# Patient Record
Sex: MALE | Race: WHITE | Employment: OTHER | URBAN - METROPOLITAN AREA
[De-identification: names, ages, dates, MRNs, and addresses within clinical notes are randomized per-mention and may not be internally consistent; named-entity substitution may affect disease eponyms.]

---

## 2019-04-02 ENCOUNTER — HOSPITAL ENCOUNTER (OUTPATIENT)
Age: 69
Setting detail: OBSERVATION
Discharge: HOME | End: 2019-04-04
Attending: EMERGENCY MEDICINE | Admitting: INTERNAL MEDICINE
Payer: COMMERCIAL

## 2019-04-02 ENCOUNTER — APPOINTMENT (OUTPATIENT)
Dept: GENERAL RADIOLOGY | Age: 69
End: 2019-04-02
Attending: EMERGENCY MEDICINE
Payer: COMMERCIAL

## 2019-04-02 DIAGNOSIS — R07.9 CHEST PAIN, UNSPECIFIED TYPE: Primary | ICD-10-CM

## 2019-04-02 DIAGNOSIS — I25.119 CORONARY ARTERY DISEASE INVOLVING NATIVE HEART WITH ANGINA PECTORIS, UNSPECIFIED VESSEL OR LESION TYPE (HCC): ICD-10-CM

## 2019-04-02 DIAGNOSIS — R07.9 CHEST PAIN, UNSPECIFIED: ICD-10-CM

## 2019-04-02 PROBLEM — I20.0 UNSTABLE ANGINA (HCC): Status: ACTIVE | Noted: 2019-04-02

## 2019-04-02 LAB
BASOPHILS # BLD: 0 K/UL (ref 0–0.1)
BASOPHILS NFR BLD: 1 % (ref 0–1)
CHOLEST SERPL-MCNC: 131 MG/DL
COMMENT, HOLDF: NORMAL
DIFFERENTIAL METHOD BLD: ABNORMAL
EOSINOPHIL # BLD: 0.3 K/UL (ref 0–0.4)
EOSINOPHIL NFR BLD: 4 % (ref 0–7)
ERYTHROCYTE [DISTWIDTH] IN BLOOD BY AUTOMATED COUNT: 11.6 % (ref 11.5–14.5)
HCT VFR BLD AUTO: 43.6 % (ref 36.6–50.3)
HDLC SERPL-MCNC: 44 MG/DL
HDLC SERPL: 3 {RATIO} (ref 0–5)
HGB BLD-MCNC: 14.4 G/DL (ref 12.1–17)
IMM GRANULOCYTES # BLD AUTO: 0 K/UL (ref 0–0.04)
IMM GRANULOCYTES NFR BLD AUTO: 0 % (ref 0–0.5)
LDLC SERPL CALC-MCNC: 69 MG/DL (ref 0–100)
LIPID PROFILE,FLP: NORMAL
LYMPHOCYTES # BLD: 1.8 K/UL (ref 0.8–3.5)
LYMPHOCYTES NFR BLD: 27 % (ref 12–49)
MCH RBC QN AUTO: 35.2 PG (ref 26–34)
MCHC RBC AUTO-ENTMCNC: 33 G/DL (ref 30–36.5)
MCV RBC AUTO: 106.6 FL (ref 80–99)
MONOCYTES # BLD: 1 K/UL (ref 0–1)
MONOCYTES NFR BLD: 15 % (ref 5–13)
NEUTS SEG # BLD: 3.6 K/UL (ref 1.8–8)
NEUTS SEG NFR BLD: 53 % (ref 32–75)
NRBC # BLD: 0.02 K/UL (ref 0–0.01)
NRBC BLD-RTO: 0.3 PER 100 WBC
PLATELET # BLD AUTO: 189 K/UL (ref 150–400)
PMV BLD AUTO: 11.1 FL (ref 8.9–12.9)
RBC # BLD AUTO: 4.09 M/UL (ref 4.1–5.7)
SAMPLES BEING HELD,HOLD: NORMAL
TRIGL SERPL-MCNC: 90 MG/DL (ref ?–150)
TROPONIN I SERPL-MCNC: <0.05 NG/ML
TROPONIN I SERPL-MCNC: <0.05 NG/ML
VLDLC SERPL CALC-MCNC: 18 MG/DL
WBC # BLD AUTO: 6.7 K/UL (ref 4.1–11.1)

## 2019-04-02 PROCEDURE — 93005 ELECTROCARDIOGRAM TRACING: CPT

## 2019-04-02 PROCEDURE — 80053 COMPREHEN METABOLIC PANEL: CPT

## 2019-04-02 PROCEDURE — 74011250636 HC RX REV CODE- 250/636: Performed by: INTERNAL MEDICINE

## 2019-04-02 PROCEDURE — 99284 EMERGENCY DEPT VISIT MOD MDM: CPT

## 2019-04-02 PROCEDURE — 36415 COLL VENOUS BLD VENIPUNCTURE: CPT

## 2019-04-02 PROCEDURE — 80061 LIPID PANEL: CPT

## 2019-04-02 PROCEDURE — 71046 X-RAY EXAM CHEST 2 VIEWS: CPT

## 2019-04-02 PROCEDURE — 85025 COMPLETE CBC W/AUTO DIFF WBC: CPT

## 2019-04-02 PROCEDURE — 99218 HC RM OBSERVATION: CPT

## 2019-04-02 PROCEDURE — 74011250637 HC RX REV CODE- 250/637: Performed by: INTERNAL MEDICINE

## 2019-04-02 PROCEDURE — 74011250637 HC RX REV CODE- 250/637: Performed by: EMERGENCY MEDICINE

## 2019-04-02 PROCEDURE — 84484 ASSAY OF TROPONIN QUANT: CPT

## 2019-04-02 PROCEDURE — 96372 THER/PROPH/DIAG INJ SC/IM: CPT

## 2019-04-02 RX ORDER — ENOXAPARIN SODIUM 150 MG/ML
1 INJECTION SUBCUTANEOUS EVERY 12 HOURS
Status: DISCONTINUED | OUTPATIENT
Start: 2019-04-03 | End: 2019-04-03

## 2019-04-02 RX ORDER — ENOXAPARIN SODIUM 150 MG/ML
120 INJECTION SUBCUTANEOUS ONCE
Status: COMPLETED | OUTPATIENT
Start: 2019-04-02 | End: 2019-04-02

## 2019-04-02 RX ORDER — SODIUM CHLORIDE 0.9 % (FLUSH) 0.9 %
5-40 SYRINGE (ML) INJECTION EVERY 8 HOURS
Status: DISCONTINUED | OUTPATIENT
Start: 2019-04-02 | End: 2019-04-04 | Stop reason: HOSPADM

## 2019-04-02 RX ORDER — METOPROLOL SUCCINATE 25 MG/1
50 TABLET, EXTENDED RELEASE ORAL
COMMUNITY

## 2019-04-02 RX ORDER — NITROGLYCERIN 0.4 MG/1
0.4 TABLET SUBLINGUAL AS NEEDED
Status: COMPLETED | OUTPATIENT
Start: 2019-04-02 | End: 2019-04-03

## 2019-04-02 RX ORDER — LORAZEPAM 2 MG/ML
1 INJECTION INTRAMUSCULAR
Status: DISCONTINUED | OUTPATIENT
Start: 2019-04-02 | End: 2019-04-04 | Stop reason: HOSPADM

## 2019-04-02 RX ORDER — NITROGLYCERIN 0.4 MG/1
0.4 TABLET SUBLINGUAL AS NEEDED
Status: DISCONTINUED | OUTPATIENT
Start: 2019-04-02 | End: 2019-04-04 | Stop reason: HOSPADM

## 2019-04-02 RX ORDER — TAMSULOSIN HYDROCHLORIDE 0.4 MG/1
0.4 CAPSULE ORAL
COMMUNITY
End: 2021-07-05

## 2019-04-02 RX ORDER — SODIUM CHLORIDE 0.9 % (FLUSH) 0.9 %
5-40 SYRINGE (ML) INJECTION AS NEEDED
Status: DISCONTINUED | OUTPATIENT
Start: 2019-04-02 | End: 2019-04-04 | Stop reason: HOSPADM

## 2019-04-02 RX ORDER — METOPROLOL TARTRATE 25 MG/1
25 TABLET, FILM COATED ORAL 2 TIMES DAILY
Status: DISCONTINUED | OUTPATIENT
Start: 2019-04-02 | End: 2019-04-04 | Stop reason: HOSPADM

## 2019-04-02 RX ORDER — LOVASTATIN 20 MG/1
10 TABLET ORAL
Status: CANCELLED | OUTPATIENT
Start: 2019-04-02

## 2019-04-02 RX ORDER — GUAIFENESIN 100 MG/5ML
81 LIQUID (ML) ORAL
Status: DISCONTINUED | OUTPATIENT
Start: 2019-04-03 | End: 2019-04-04 | Stop reason: HOSPADM

## 2019-04-02 RX ORDER — ASPIRIN 325 MG
325 TABLET ORAL ONCE
Status: COMPLETED | OUTPATIENT
Start: 2019-04-02 | End: 2019-04-02

## 2019-04-02 RX ORDER — ZOLPIDEM TARTRATE 5 MG/1
5 TABLET ORAL
Status: DISCONTINUED | OUTPATIENT
Start: 2019-04-02 | End: 2019-04-04 | Stop reason: HOSPADM

## 2019-04-02 RX ADMIN — ASPIRIN 325 MG: 325 TABLET ORAL at 21:56

## 2019-04-02 RX ADMIN — NITROGLYCERIN 1 INCH: 20 OINTMENT TOPICAL at 23:49

## 2019-04-02 RX ADMIN — ENOXAPARIN SODIUM 120 MG: 120 INJECTION SUBCUTANEOUS at 21:57

## 2019-04-02 RX ADMIN — METOPROLOL TARTRATE 25 MG: 25 TABLET ORAL at 21:01

## 2019-04-02 RX ADMIN — NITROGLYCERIN 0.4 MG: 0.4 TABLET, ORALLY DISINTEGRATING SUBLINGUAL at 20:33

## 2019-04-02 RX ADMIN — Medication 10 ML: at 21:59

## 2019-04-02 RX ADMIN — NITROGLYCERIN 0.4 MG: 0.4 TABLET, ORALLY DISINTEGRATING SUBLINGUAL at 19:48

## 2019-04-02 NOTE — Clinical Note
Lesion: Located in the Mid LAD. Stent inserted. Single technique and multiple inflations used. First inflation pressure = 14 ole; inflation time: 15 sec. Second inflation pressure: 12 ole; inflation time: 10 sec.

## 2019-04-02 NOTE — Clinical Note
Lesion located in the Mid LAD. Balloon inserted. Balloon inflated using single inflation technique. Pressure = 13 ole; Duration = 14 sec.

## 2019-04-02 NOTE — Clinical Note
Lesion located in the Mid LAD. Balloon inserted. Balloon inflated using multiple inflations inflation technique. Pressure = 8 ole; Duration = 13 sec. Inflation 2: Pressure: 8 ole; Duration: 7 sec. Inflation 3: Pressure: 18 ole; Duration: 16 sec.

## 2019-04-02 NOTE — ED PROVIDER NOTES
HPI      75y M with hx of CAD here with sharp right sided chest pain. Started this evening. Had a stress test earlier today due to an abnormal/different ECG at his cardiologist in the past 2 weeks. Wasn't having pain like this until he got home. Having a little shortness of breath as well. Pain feels like prior cardiac chest pain. No fever. No rash. No cough. Nothing makes sx's better or worse. Doesn't sound exertional.     Past Medical History:   Diagnosis Date    CAD (coronary artery disease)     3 stents    Other ill-defined conditions     bph    Other ill-defined conditions     hypercholesterolemia    Other ill-defined conditions     accidental finger amputation       Past Surgical History:   Procedure Laterality Date    CARDIAC SURG PROCEDURE UNLIST      3 stents    HX ORTHOPAEDIC      l sholder surg         No family history on file.     Social History     Socioeconomic History    Marital status:      Spouse name: Not on file    Number of children: Not on file    Years of education: Not on file    Highest education level: Not on file   Occupational History    Not on file   Social Needs    Financial resource strain: Not on file    Food insecurity:     Worry: Not on file     Inability: Not on file    Transportation needs:     Medical: Not on file     Non-medical: Not on file   Tobacco Use    Smoking status: Former Smoker     Packs/day: 1.00     Years: 30.00     Pack years: 30.00     Last attempt to quit: 2010     Years since quittin.3    Smokeless tobacco: Never Used   Substance and Sexual Activity    Alcohol use: Yes     Comment: socially    Drug use: No    Sexual activity: Not on file   Lifestyle    Physical activity:     Days per week: Not on file     Minutes per session: Not on file    Stress: Not on file   Relationships    Social connections:     Talks on phone: Not on file     Gets together: Not on file     Attends Yazidi service: Not on file     Active member of club or organization: Not on file     Attends meetings of clubs or organizations: Not on file     Relationship status: Not on file    Intimate partner violence:     Fear of current or ex partner: Not on file     Emotionally abused: Not on file     Physically abused: Not on file     Forced sexual activity: Not on file   Other Topics Concern    Not on file   Social History Narrative    Not on file         ALLERGIES: Pcn [penicillins]    Review of Systems   Review of Systems   Constitutional: (-) weight loss. HEENT: (-) stiff neck   Eyes: (-) discharge. Respiratory: (-) cough. Cardiovascular: (-) syncope. Gastrointestinal: (-) blood in stool. Genitourinary: (-) hematuria. Musculoskeletal: (-) myalgias. Neurological: (-) seizure. Skin: (-) petechiae  Lymph/Immunologic: (-) enlarged lymph nodes  All other systems reviewed and are negative. Vitals:    04/02/19 1902   Pulse: 96   SpO2: 96%            Physical Exam Nursing note and vitals reviewed. Constitutional: oriented to person, place, and time. appears well-developed and well-nourished. No distress. Head: Normocephalic and atraumatic. Sclera anicteric  Nose: No rhinorrhea  Mouth/Throat: Oropharynx is clear and moist. Pharynx normal  Eyes: Conjunctivae are normal. Pupils are equal, round, and reactive to light. Right eye exhibits no discharge. Left eye exhibits no discharge. Neck: Painless normal range of motion. Neck supple. No LAD. Cardiovascular: Normal rate, regular rhythm, normal heart sounds and intact distal pulses. Exam reveals no gallop and no friction rub. No murmur heard. Pulmonary/Chest:  No respiratory distress. No wheezes. No rales. No rhonchi. No increased work of breathing. No accessory muscle use. Good air exchange throughout. Abdominal: soft, non-tender, no rebound or guarding. No hepatosplenomegaly. Normal bowel sounds throughout.   Back: no tenderness to palpation, no deformities, no CVA tenderness  Extremities/Musculoskeletal: Normal range of motion. no tenderness. No edema. Distal extremities are neurovasc intact. Lymphadenopathy:   No adenopathy. Neurological:  Alert and oriented to person, place, and time. Coordination normal. CN 2-12 intact. Motor and sensory function intact. Skin: Skin is warm and dry. No rash noted. No pallor. MDM 75y M with CAD here with chest pain. Sent in by cards - Dr. Symone Saini seeing now. Getting labs, ECG, CXR. Procedures         ED EKG interpretation:  Rhythm: normal sinus rhythm; and regular . Rate (approx.): 86; Axis: normal; P wave: normal; QRS interval: normal ; ST/T wave: normal;  This EKG was interpreted by Hugh Ross MD,ED Provider.

## 2019-04-02 NOTE — ED TRIAGE NOTES
Referred by Dr. Suyapa Trivedi to ED for c/o CP. Pt states he had abnormal EKG done today and also had stress test today.

## 2019-04-03 LAB
ALBUMIN SERPL-MCNC: 3.6 G/DL (ref 3.5–5)
ALBUMIN/GLOB SERPL: 0.9 {RATIO} (ref 1.1–2.2)
ALP SERPL-CCNC: 71 U/L (ref 45–117)
ALT SERPL-CCNC: 42 U/L (ref 12–78)
ANION GAP SERPL CALC-SCNC: 5 MMOL/L (ref 5–15)
AST SERPL-CCNC: 25 U/L (ref 15–37)
ATRIAL RATE: 62 BPM
ATRIAL RATE: 86 BPM
BILIRUB SERPL-MCNC: 0.3 MG/DL (ref 0.2–1)
BUN SERPL-MCNC: 19 MG/DL (ref 6–20)
BUN/CREAT SERPL: 20 (ref 12–20)
CALCIUM SERPL-MCNC: 9.3 MG/DL (ref 8.5–10.1)
CALCULATED P AXIS, ECG09: 21 DEGREES
CALCULATED P AXIS, ECG09: 42 DEGREES
CALCULATED R AXIS, ECG10: -40 DEGREES
CALCULATED R AXIS, ECG10: -41 DEGREES
CALCULATED T AXIS, ECG11: -19 DEGREES
CALCULATED T AXIS, ECG11: 28 DEGREES
CHLORIDE SERPL-SCNC: 107 MMOL/L (ref 97–108)
CO2 SERPL-SCNC: 29 MMOL/L (ref 21–32)
CREAT SERPL-MCNC: 0.97 MG/DL (ref 0.7–1.3)
DIAGNOSIS, 93000: NORMAL
DIAGNOSIS, 93000: NORMAL
GLOBULIN SER CALC-MCNC: 3.8 G/DL (ref 2–4)
GLUCOSE SERPL-MCNC: 93 MG/DL (ref 65–100)
P-R INTERVAL, ECG05: 188 MS
P-R INTERVAL, ECG05: 194 MS
POTASSIUM SERPL-SCNC: 4.5 MMOL/L (ref 3.5–5.1)
PROT SERPL-MCNC: 7.4 G/DL (ref 6.4–8.2)
Q-T INTERVAL, ECG07: 360 MS
Q-T INTERVAL, ECG07: 432 MS
QRS DURATION, ECG06: 90 MS
QRS DURATION, ECG06: 96 MS
QTC CALCULATION (BEZET), ECG08: 430 MS
QTC CALCULATION (BEZET), ECG08: 438 MS
SODIUM SERPL-SCNC: 141 MMOL/L (ref 136–145)
VENTRICULAR RATE, ECG03: 62 BPM
VENTRICULAR RATE, ECG03: 86 BPM

## 2019-04-03 PROCEDURE — 77030012468 HC VLV BLEEDBK CNTRL ABBT -B: Performed by: INTERNAL MEDICINE

## 2019-04-03 PROCEDURE — 99153 MOD SED SAME PHYS/QHP EA: CPT | Performed by: INTERNAL MEDICINE

## 2019-04-03 PROCEDURE — C1769 GUIDE WIRE: HCPCS | Performed by: INTERNAL MEDICINE

## 2019-04-03 PROCEDURE — 74011250637 HC RX REV CODE- 250/637: Performed by: INTERNAL MEDICINE

## 2019-04-03 PROCEDURE — 99218 HC RM OBSERVATION: CPT

## 2019-04-03 PROCEDURE — C1894 INTRO/SHEATH, NON-LASER: HCPCS | Performed by: INTERNAL MEDICINE

## 2019-04-03 PROCEDURE — 74011636320 HC RX REV CODE- 636/320: Performed by: INTERNAL MEDICINE

## 2019-04-03 PROCEDURE — C1887 CATHETER, GUIDING: HCPCS | Performed by: INTERNAL MEDICINE

## 2019-04-03 PROCEDURE — C1874 STENT, COATED/COV W/DEL SYS: HCPCS | Performed by: INTERNAL MEDICINE

## 2019-04-03 PROCEDURE — 77030019569 HC BND COMPR RAD TERU -B: Performed by: INTERNAL MEDICINE

## 2019-04-03 PROCEDURE — 96360 HYDRATION IV INFUSION INIT: CPT

## 2019-04-03 PROCEDURE — 74011000258 HC RX REV CODE- 258: Performed by: INTERNAL MEDICINE

## 2019-04-03 PROCEDURE — 99152 MOD SED SAME PHYS/QHP 5/>YRS: CPT | Performed by: INTERNAL MEDICINE

## 2019-04-03 PROCEDURE — 93458 L HRT ARTERY/VENTRICLE ANGIO: CPT | Performed by: INTERNAL MEDICINE

## 2019-04-03 PROCEDURE — 77030010221 HC SPLNT WR POS TELE -B: Performed by: INTERNAL MEDICINE

## 2019-04-03 PROCEDURE — 74011250636 HC RX REV CODE- 250/636: Performed by: INTERNAL MEDICINE

## 2019-04-03 PROCEDURE — 77030013744: Performed by: INTERNAL MEDICINE

## 2019-04-03 PROCEDURE — C1725 CATH, TRANSLUMIN NON-LASER: HCPCS | Performed by: INTERNAL MEDICINE

## 2019-04-03 PROCEDURE — 77030015766: Performed by: INTERNAL MEDICINE

## 2019-04-03 PROCEDURE — 92928 PRQ TCAT PLMT NTRAC ST 1 LES: CPT | Performed by: INTERNAL MEDICINE

## 2019-04-03 PROCEDURE — 77030013715 HC INFL SYS MRTM -B: Performed by: INTERNAL MEDICINE

## 2019-04-03 PROCEDURE — 74011250636 HC RX REV CODE- 250/636

## 2019-04-03 PROCEDURE — 93005 ELECTROCARDIOGRAM TRACING: CPT

## 2019-04-03 PROCEDURE — 77030004532 HC CATH ANGI DX IMP BSC -A: Performed by: INTERNAL MEDICINE

## 2019-04-03 PROCEDURE — 74011000250 HC RX REV CODE- 250: Performed by: INTERNAL MEDICINE

## 2019-04-03 DEVICE — STENT RONYX35026W RESOLUTE ONYX 3.50X26
Type: IMPLANTABLE DEVICE | Status: FUNCTIONAL
Brand: RESOLUTE ONYX™

## 2019-04-03 RX ORDER — HEPARIN SODIUM 1000 [USP'U]/ML
INJECTION, SOLUTION INTRAVENOUS; SUBCUTANEOUS AS NEEDED
Status: DISCONTINUED | OUTPATIENT
Start: 2019-04-03 | End: 2019-04-03 | Stop reason: HOSPADM

## 2019-04-03 RX ORDER — NITROGLYCERIN 20 MG/100ML
40 INJECTION INTRAVENOUS CONTINUOUS
Status: DISCONTINUED | OUTPATIENT
Start: 2019-04-03 | End: 2019-04-04 | Stop reason: HOSPADM

## 2019-04-03 RX ORDER — LIDOCAINE HYDROCHLORIDE 10 MG/ML
INJECTION INFILTRATION; PERINEURAL AS NEEDED
Status: DISCONTINUED | OUTPATIENT
Start: 2019-04-03 | End: 2019-04-03 | Stop reason: HOSPADM

## 2019-04-03 RX ORDER — FENTANYL CITRATE 50 UG/ML
INJECTION, SOLUTION INTRAMUSCULAR; INTRAVENOUS AS NEEDED
Status: DISCONTINUED | OUTPATIENT
Start: 2019-04-03 | End: 2019-04-03 | Stop reason: HOSPADM

## 2019-04-03 RX ORDER — VERAPAMIL HYDROCHLORIDE 2.5 MG/ML
INJECTION, SOLUTION INTRAVENOUS AS NEEDED
Status: DISCONTINUED | OUTPATIENT
Start: 2019-04-03 | End: 2019-04-03 | Stop reason: HOSPADM

## 2019-04-03 RX ORDER — SODIUM CHLORIDE 9 MG/ML
3 INJECTION, SOLUTION INTRAVENOUS CONTINUOUS
Status: DISPENSED | OUTPATIENT
Start: 2019-04-03 | End: 2019-04-03

## 2019-04-03 RX ORDER — ACETAMINOPHEN 325 MG/1
650 TABLET ORAL
Status: DISCONTINUED | OUTPATIENT
Start: 2019-04-03 | End: 2019-04-04 | Stop reason: HOSPADM

## 2019-04-03 RX ORDER — FENTANYL CITRATE 50 UG/ML
50 INJECTION, SOLUTION INTRAMUSCULAR; INTRAVENOUS
Status: DISCONTINUED | OUTPATIENT
Start: 2019-04-03 | End: 2019-04-04 | Stop reason: HOSPADM

## 2019-04-03 RX ORDER — MIDAZOLAM HYDROCHLORIDE 1 MG/ML
INJECTION, SOLUTION INTRAMUSCULAR; INTRAVENOUS AS NEEDED
Status: DISCONTINUED | OUTPATIENT
Start: 2019-04-03 | End: 2019-04-03 | Stop reason: HOSPADM

## 2019-04-03 RX ORDER — AMLODIPINE BESYLATE 5 MG/1
5 TABLET ORAL DAILY
Status: DISCONTINUED | OUTPATIENT
Start: 2019-04-03 | End: 2019-04-04 | Stop reason: HOSPADM

## 2019-04-03 RX ORDER — METOPROLOL TARTRATE 5 MG/5ML
INJECTION INTRAVENOUS AS NEEDED
Status: DISCONTINUED | OUTPATIENT
Start: 2019-04-03 | End: 2019-04-03 | Stop reason: HOSPADM

## 2019-04-03 RX ORDER — NITROGLYCERIN 20 MG/100ML
0-20 INJECTION INTRAVENOUS
Status: DISCONTINUED | OUTPATIENT
Start: 2019-04-03 | End: 2019-04-04 | Stop reason: HOSPADM

## 2019-04-03 RX ORDER — HEPARIN SODIUM 200 [USP'U]/100ML
INJECTION, SOLUTION INTRAVENOUS
Status: COMPLETED | OUTPATIENT
Start: 2019-04-03 | End: 2019-04-03

## 2019-04-03 RX ORDER — SODIUM CHLORIDE 9 MG/ML
1.5 INJECTION, SOLUTION INTRAVENOUS CONTINUOUS
Status: DISPENSED | OUTPATIENT
Start: 2019-04-03 | End: 2019-04-03

## 2019-04-03 RX ORDER — NITROGLYCERIN 20 MG/100ML
10 INJECTION INTRAVENOUS CONTINUOUS
Status: DISCONTINUED | OUTPATIENT
Start: 2019-04-03 | End: 2019-04-03

## 2019-04-03 RX ADMIN — SODIUM CHLORIDE 3 ML/KG/HR: 900 INJECTION, SOLUTION INTRAVENOUS at 07:20

## 2019-04-03 RX ADMIN — NITROGLYCERIN 1 INCH: 20 OINTMENT TOPICAL at 06:51

## 2019-04-03 RX ADMIN — FENTANYL CITRATE 50 MCG: 50 INJECTION, SOLUTION INTRAMUSCULAR; INTRAVENOUS at 10:17

## 2019-04-03 RX ADMIN — AMLODIPINE BESYLATE 5 MG: 5 TABLET ORAL at 10:34

## 2019-04-03 RX ADMIN — TICAGRELOR 90 MG: 90 TABLET ORAL at 21:05

## 2019-04-03 RX ADMIN — ACETAMINOPHEN 650 MG: 325 TABLET ORAL at 21:45

## 2019-04-03 RX ADMIN — NITROGLYCERIN 15 MCG/MIN: 20 INJECTION INTRAVENOUS at 09:28

## 2019-04-03 RX ADMIN — METOPROLOL TARTRATE 25 MG: 25 TABLET ORAL at 18:43

## 2019-04-03 RX ADMIN — NITROGLYCERIN 0.4 MG: 0.4 TABLET, ORALLY DISINTEGRATING SUBLINGUAL at 03:22

## 2019-04-03 RX ADMIN — SODIUM CHLORIDE 1.5 ML/KG/HR: 900 INJECTION, SOLUTION INTRAVENOUS at 08:20

## 2019-04-03 RX ADMIN — NITROGLYCERIN 0.4 MG: 0.4 TABLET, ORALLY DISINTEGRATING SUBLINGUAL at 21:45

## 2019-04-03 RX ADMIN — Medication 10 ML: at 21:05

## 2019-04-03 RX ADMIN — Medication 10 ML: at 06:00

## 2019-04-03 RX ADMIN — NITROGLYCERIN 10 MCG/MIN: 20 INJECTION INTRAVENOUS at 09:22

## 2019-04-03 RX ADMIN — NITROGLYCERIN 40 MCG/MIN: 20 INJECTION INTRAVENOUS at 10:18

## 2019-04-03 RX ADMIN — METOPROLOL TARTRATE 25 MG: 25 TABLET ORAL at 10:32

## 2019-04-03 RX ADMIN — ASPIRIN 81 MG CHEWABLE TABLET 81 MG: 81 TABLET CHEWABLE at 10:32

## 2019-04-03 NOTE — PROGRESS NOTES
1930: Bedside shift change report given to Donna (oncoming nurse) by Ty Dumont RN (offgoing nurse). Report included the following information SBAR, Kardex, Procedure Summary, Intake/Output, MAR and Recent Results.

## 2019-04-03 NOTE — H&P
Cardiology History and Physical     Date of  Admission: 4/2/2019     Admission type: Emergency    Jose Enrique Emery is a 80 y.o. male admitted for Unstable angina ([facility] [I20.0]. Patient complains of onset of Rt severe chest tightness with Rt arm radiation at 4 pm today. He had just taken Lexiscan stress test this and was sent home around 1 pm. He had some SOB during Lexiscan injection. He has had recent increasing DOLAN/SOB and was seen two weeks ago by Dr. Krissy Driscoll who noted some ekg changes. His past cardiac data include CAD, h/o MI, s/p stent in LAD in 2005 and RCA in 2007, last cath in 2011 showing mid LAD 40% at the bifurcation of small to medium caliber D1 which has 70% lesion. Patient Active Problem List    Diagnosis Date Noted    Unstable angina ([facility] [address] 04/02/2019      Rosangela Johnson MD  Past Medical History:   Diagnosis Date    CAD (coronary artery disease)     3 stents    Other ill-defined conditions(799.89)     bph    Other ill-defined conditions(799.89)     hypercholesterolemia    Other ill-defined conditions(799.89)     accidental finger amputation      Past Surgical History:   Procedure Laterality Date    CARDIAC SURG PROCEDURE UNLIST      3 stents    HX ORTHOPAEDIC      l sholder surg    HX SHOULDER REPLACEMENT Right      History reviewed. No pertinent family history. Prior to Admission medications    Medication Sig Start Date End Date Taking? Authorizing Provider   silodosin (RAPAFLO) 8 mg capsule Take 8 mg by mouth daily (with breakfast). Yes Provider, Historical   lovastatin (MEVACOR) 10 mg tablet Take  by mouth nightly. Yes Other, MD Rahel   FINASTERIDE PO Take 5 mg by mouth. Yes Luis Angel, MD Rahel   aspirin 81 mg tablet Take  by mouth.    Yes Other, MD Rahel      Allergies   Allergen Reactions    Pcn [Penicillins] Hives     Pt reports it makes him sleepy and have hives        Review of Systems    Review of Systems  Except as noted in HPI, patient denies recent fever or chills, nausea, vomiting, diarrhea, hemoptysis, hematemesis, dysuria, myalgias, focal neurologic symptoms, ecchymosis, angioedema, odynophagia, dysphagia, sore throat, earache,rash, melena, hematochezia, depression, GERD, cold intolerance, petechia, bleeding gums, or significant weight loss. A comprehensive review of systems was negative except for that written in the HPI. Physical Exam    Objective:    Physical Exam:  24 hr VS reviewed, overall VSSAF  Temp (24hrs), Av.8 °F (37.1 °C), Min:98.8 °F (37.1 °C), Max:98.8 °F (37.1 °C)    Patient Vitals for the past 8 hrs:   Pulse   19 74   19 96    Patient Vitals for the past 8 hrs:   Resp   19 18    Patient Vitals for the past 8 hrs:   BP   19 (!) 171/97        No intake or output data in the 24 hours ending 19  General Appearance:   [x] well developed, well nourished,  [x]NAD. []     agitated   []     lethargic but arousable  []     obtunded   ENT, Palate:    [x] WNL   []     dry palate/MM     [x]anicteric     Respiratory:    [x]CTA bilateral  [] rales  [] rhonchi  [] normal resp effort   Cardiovascular:   [x] RRR   [] Irregular rate and rhythm  [] ectopy   [x] Normal S1, S2   [x]  No gallop or rub.    [] no murmur   [] murmur c/w:   [x]  no edema RLE:[]1+ [] 2+ []3+; LLE:[]1+ []2+ [] 3+   [x]  normal JVP   []     Elevated JVP    [x] carotid upstroke nl   [x] abd aorta not palpated   [x] no stigmata of peripheral emboli   GI:    [x] abd soft, non-distented,bowel sounds present, no                     organomegaly appreciated   Skin:  Neuro:      [x]  warm and dry []     cold extremities   [x]  A/O x 3,  [x]     grossly non-focal    []  lethargic but arousable  [] obtunded        Cardiographics    Telemetry: normal sinus rhythm  ECG: normal EKG, normal sinus rhythm, unchanged from previous tracings  Echocardiogram: Not done    Labs:   Recent Results (from the past 24 hour(s))   EKG, 12 LEAD, INITIAL Collection Time: 04/02/19  7:08 PM   Result Value Ref Range    Ventricular Rate 86 BPM    Atrial Rate 86 BPM    P-R Interval 188 ms    QRS Duration 90 ms    Q-T Interval 360 ms    QTC Calculation (Bezet) 430 ms    Calculated P Axis 21 degrees    Calculated R Axis -41 degrees    Calculated T Axis 28 degrees    Diagnosis       Normal sinus rhythm  Left axis deviation  When compared with ECG of 30-NOV-2014 09:36,  QRS duration has decreased  Nonspecific T wave abnormality no longer evident in Lateral leads     SAMPLES BEING HELD    Collection Time: 04/02/19  7:14 PM   Result Value Ref Range    SAMPLES BEING HELD 1RED, 1LAV, 1PST, 1BLU     COMMENT        Add-on orders for these samples will be processed based on acceptable specimen integrity and analyte stability, which may vary by analyte. CBC WITH AUTOMATED DIFF    Collection Time: 04/02/19  7:14 PM   Result Value Ref Range    WBC 6.7 4.1 - 11.1 K/uL    RBC 4.09 (L) 4.10 - 5.70 M/uL    HGB 14.4 12.1 - 17.0 g/dL    HCT 43.6 36.6 - 50.3 %    .6 (H) 80.0 - 99.0 FL    MCH 35.2 (H) 26.0 - 34.0 PG    MCHC 33.0 30.0 - 36.5 g/dL    RDW 11.6 11.5 - 14.5 %    PLATELET 148 907 - 985 K/uL    MPV 11.1 8.9 - 12.9 FL    NRBC 0.3 (H) 0  WBC    ABSOLUTE NRBC 0.02 (H) 0.00 - 0.01 K/uL    NEUTROPHILS 53 32 - 75 %    LYMPHOCYTES 27 12 - 49 %    MONOCYTES 15 (H) 5 - 13 %    EOSINOPHILS 4 0 - 7 %    BASOPHILS 1 0 - 1 %    IMMATURE GRANULOCYTES 0 0.0 - 0.5 %    ABS. NEUTROPHILS 3.6 1.8 - 8.0 K/UL    ABS. LYMPHOCYTES 1.8 0.8 - 3.5 K/UL    ABS. MONOCYTES 1.0 0.0 - 1.0 K/UL    ABS. EOSINOPHILS 0.3 0.0 - 0.4 K/UL    ABS. BASOPHILS 0.0 0.0 - 0.1 K/UL    ABS. IMM.  GRANS. 0.0 0.00 - 0.04 K/UL    DF AUTOMATED          Assessment:     Assessment:      Active Problems:    Unstable angina ([address] (4/2/2019)    CAD  S/p stents   HTN; uncontrolled     Plan:     Admit to tele  NTP  ASA  Lovenox  BB  He needs cardiac cath in am    Signed By: Karin Hancock MD     April 2, 2019

## 2019-04-03 NOTE — PROGRESS NOTES
0820:   Cardiac Cath Lab Procedure Area Arrival Note:    Isabelle Kendrick arrived to Cardiac Cath Lab, Procedure Area. Patient identifiers verified with NAME and DATE OF BIRTH. Procedure verified with patient. Consent forms verified. Allergies verified. Patient informed of procedure and plan of care. Questions answered with review. Patient voiced understanding of procedure and plan of care. Patient on cardiac monitor, non-invasive blood pressure, SPO2 monitor. On RA placed on O2 @ 2 lpm via NC.  IV of NS on pump at 172 ml/hr. Patient status doing well without problems. Patient is A&Ox 4. Patient reports no pain. Patient medicated during procedure with orders obtained and verified by Dr. Sherrie Mooney. Refer to patients Cardiac Cath Lab PROCEDURE REPORT for vital signs, assessment, status, and response during procedure, printed at end of case. Printed report on chart or scanned into chart. 0945: Transfer to [address] [place] [address] from Procedure Area    Verbal report given to Charis Collins on Isabelle Kendrick being transferred to Cardiac Cath Lab RR for routine post - op   Patient is post LHC, DESx1 to LAD procedure. Patient stable upon transfer to . Report consisted of patients Situation, Background, Assessment and   Recommendations(SBAR). Information from the following report(s) Procedure Summary and MAR was reviewed with the receiving nurse. Opportunity for questions and clarification was provided. Patient medicated during procedure with orders obtained and verified by Dr. Sherrie Mooney. Refer to patient PROCEDURE REPORT for vital signs, assessment, status, and response during procedure.

## 2019-04-03 NOTE — PROGRESS NOTES
P[atient received in cath lab RR; hooked to monitoring equipment; armband; consent; allergies checked.

## 2019-04-03 NOTE — CARDIO/PULMONARY
Cardiac Rehab: CAD education folder given to [facility]. Educated using teach back method. Reviewed CAD diagnosis definition and purpose of intervention. Discussed risk factors for CAD to include the following: previous stenting, elevated BMI, hypertension. Reviewed the importance of medication compliance. Emphasized the value of cardiac rehab. Discussed Cardiac Rehab Program format, benefits, and encouraged enrollment to assist with risk modification and management. 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 We agreed that Cardiac Rehab would call him in May (the week of 27 th) to try to schedule for June. He requested that if he does not answer to please leave a message. [facility] verbalized understanding with questions answered. He is being followed by an orthopedists for \"nerve damage\" in his left hand.     Micheline Eng RN

## 2019-04-03 NOTE — PROGRESS NOTES
Reason for Admission:  Patient presented with unstable angina                   RRAT Score:  2                   Plan for utilizing home health: None identified at this time, patient is not home bound                       Current Advanced Directive/Advance Care Plan: Patient is Full code, none on file    Likelihood of Readmission:  Low                         Transition of Care Plan:  Home    The CM met with the patient at bedside in order to introduce the role of CM and assess for patient needs. The patient lives at home with his wife- verified demographics and insurance information, however, patient states he also has Josiah Miller- not on file, CM inquired about insurance card, the patient stated his wife has his cards- will bring tomorrow or this evening to be uploaded. The patient verified that [facility] is primary (patient is still working full-time as a teacher). The patient teaches mathematics and statistics full-time. The patient endorses being independent with ADLs and mobility, spouse will transport home. OBS letter presented at bedside- signed copy placed on chart, additional copy left with patient at bedside. JAYCE Zhou    Care Management Interventions  PCP Verified by CM: Yes(Patient stated Dr. Katherine Treviño was his PCP but has not seen in approximately 10 years- predominately follows with Cardiology)  Mode of Transport at Discharge:  Other (see comment)(Spouse will transport home upon discharge)  Transition of Care Consult (CM Consult): Discharge Planning  MyChart Signup: No  Discharge Durable Medical Equipment: No  Health Maintenance Reviewed: Yes  Physical Therapy Consult: No  Occupational Therapy Consult: No  Speech Therapy Consult: No  Current Support Network: Lives with Spouse, Own Home  Confirm Follow Up Transport: Family  Plan discussed with Pt/Family/Caregiver: Yes   Resource Information Provided?: No  Discharge Location  Discharge Placement: Home

## 2019-04-03 NOTE — PROCEDURES
Cardiac Catheterization Procedure Note   Patient: Mario Pang  MRN: [id number]  SSN: [id number]   YOB: 1950 Age: 80 y.o. Sex: male    Date of Procedure: 4/3/2019   Pre-procedure Diagnosis: Unstable Angina  Post-procedure Diagnosis: Coronary Artery Disease  Procedure: Left Heart Cath, PCI and to LAD  :  Dr. Reeda Gottron, MD    Assistant(s):  None  Anesthesia: Moderate Sedation   Estimated Blood Loss: Less than 10 mL   Specimens Removed: None  Findings: 90% tubular stenosis in the mid LAD treated with 3.5 x 26 mm BELKIS. With an excellent angiographic result. There was transient side branch (D1) occlusion follow by sluggish flow in this branch with CP. This CP eventually resolved. Nonocclusive disease in the CX and RCA; normal LV systolic function.   Complications: None   Implants:  None  Signed by:  Reeda Gottron, MD  4/3/2019  6:33 PM

## 2019-04-03 NOTE — PROGRESS NOTES
TRANSFER - IN REPORT:    Verbal report received from Ivis Zuñiga  (name) on Екатерина Piper  being received from Cath Lab(unit) for routine progression of care      Report consisted of patients Situation, Background, Assessment and   Recommendations(SBAR). Information from the following report(s) SBAR, Kardex, Procedure Summary, Intake/Output, MAR and Recent Results was reviewed with the receiving nurse. Opportunity for questions and clarification was provided. Assessment completed upon patients arrival to unit and care assumed. 1230: 2 ml of air out of TR band. No bleeding or hematoma. 1245: 3 ml of air out of TR band. No bleeding or hematoma. 1315: 3 ml of air out of TR band. No bleeding or hematoma. 1330: TR band off. No sign of bleeding or hematoma. Nitro gtt titrated down to 20 mcg/min    1345: Nitro gtt titrated down to 15 mcg/min    1415: Nitro gtt titrated down to 10 mcg/min     1430: Nitro gtt titrated down to 5 mcg/min.

## 2019-04-03 NOTE — PROGRESS NOTES
NUTRITION       Nutrition screening referral was triggered based on results obtained during nursing admission assessment for \"unsure wt loss. \"    The patient's chart was reviewed and nutrition assessment is not indicated at this time. No wt loss noted and no reports of decreased appetite noted. Wt Readings from Last 10 Encounters:   04/03/19 114.9 kg (253 lb 6.4 oz)   11/29/14 108.9 kg (240 lb)   05/05/13 104.3 kg (230 lb)   11/25/11 114.3 kg (252 lb)   01/17/11 108.9 kg (240 lb)     Plan to see patient for rescreen as indicated. Thank you.      Berkley Ward RD

## 2019-04-03 NOTE — ROUTINE PROCESS
TRANSFER - OUT REPORT:    Verbal report given to Dayanna Perez RN (name) on Carlos Irving  being transferred to  (unit) for routine progression of care       Report consisted of patients Situation, Background, Assessment and   Recommendations(SBAR). Information from the following report(s) SBAR, Kardex, ED Summary, Intake/Output, MAR and Recent Results was reviewed with the receiving nurse. Lines:   Peripheral IV 04/02/19 Right Antecubital (Active)   Site Assessment Clean, dry, & intact 4/2/2019  7:20 PM   Phlebitis Assessment 0 4/2/2019  7:20 PM   Infiltration Assessment 0 4/2/2019  7:20 PM   Dressing Status Clean, dry, & intact 4/2/2019  7:20 PM   Dressing Type Tape;Transparent 4/2/2019  7:20 PM   Hub Color/Line Status Pink;Patent; Flushed 4/2/2019  7:20 PM   Action Taken Blood drawn 4/2/2019  7:20 PM   Alcohol Cap Used No 4/2/2019  7:20 PM        Opportunity for questions and clarification was provided.       Patient transported with:  [employer]

## 2019-04-03 NOTE — PROGRESS NOTES
TRANSFER - OUT REPORT:    Verbal report given to Jannette Young on Joseph Hilton being transferred to [address] [place] [address] for routine progression of care       Report consisted of patients Situation, Background, Assessment and   Recommendations(SBAR). Information from the following report(s) SBAR was reviewed with the receiving nurse. Opportunity for questions and clarification was provided.

## 2019-04-03 NOTE — PROGRESS NOTES
TRANSFER - IN REPORT:    Verbal report received from Cheryl Davila on Dread Marilyn  being received from procedure room for routine progression of care. Report consisted of patients Situation, Background, Assessment and Recommendations(SBAR). Information from the following report(s) SBAR was reviewed with the receiving clinician. Opportunity for questions and clarification was provided. Assessment completed upon patients arrival to [address] [place] [address] and care assumed. Cardiac Cath Lab Recovery Arrival Note:    Dread Marilyn arrived to [facility] recovery area. Patient procedure= :HC; PCI of LAD. Patient on cardiac monitor, non-invasive blood pressure, SPO2 monitor. On  O2 @ 2 lpm via NC.  IV  of NS on pump at 172 ml/hr.; Ntg @ 20mcgs Patient status doing well without problems. Patient is A&Ox 3. Patient reports CP 8/10. PROCEDURE SITE CHECK:    Procedure site:without any bleeding and no hematoma, No pain/discomfort reported at procedure site. No change in patient status. Continue to monitor patient and status.

## 2019-04-03 NOTE — PROGRESS NOTES
Admission Medication Reconciliation:    Information obtained from: This medication history was obtained from the patient and his wife. He brought a list of medications and she brought pictures of his medication bottles. An Rx Query is available. Summary:     Medications added: metoprolol succinate, tamsulosin  Medications deleted: silodosin, lovastatin         Chief Complaint for this Admission:  unstable angina     Significant PMH/Disease States:   Past Medical History:   Diagnosis Date    CAD (coronary artery disease)     3 stents    Other ill-defined conditions(799.89)     bph    Other ill-defined conditions(799.89)     hypercholesterolemia    Other ill-defined conditions(799.89)     accidental finger amputation       Allergies:  Pcn [penicillins]    Prior to Admission Medications:   Prior to Admission Medications   Prescriptions Last Dose Informant Patient Reported? Taking?   aspirin 81 mg chewable tablet 4/2/2019 at am  Yes Yes   Sig: Take 81 mg by mouth daily. finasteride (PROSCAR) 5 mg tablet 4/2/2019 at am  Yes Yes   Sig: Take 5 mg by mouth daily. metoprolol succinate (TOPROL-XL) 25 mg XL tablet 4/1/2019  Yes Yes   Sig: Take 25 mg by mouth nightly. tamsulosin (FLOMAX) 0.4 mg capsule 4/1/2019  Yes Yes   Sig: Take 0.4 mg by mouth nightly. Facility-Administered Medications: None         Thank you for allowing me to participate in the care of this patient. Please contact the pharmacy () or the medication reconciliation pharmacist () with any questions. Silvano Redding, Pharm. D., BCPS, BCPPS

## 2019-04-04 VITALS
RESPIRATION RATE: 18 BRPM | BODY MASS INDEX: 36.23 KG/M2 | DIASTOLIC BLOOD PRESSURE: 98 MMHG | HEART RATE: 88 BPM | OXYGEN SATURATION: 93 % | TEMPERATURE: 98.2 F | SYSTOLIC BLOOD PRESSURE: 159 MMHG | HEIGHT: 70 IN | WEIGHT: 253.09 LBS

## 2019-04-04 LAB
ATRIAL RATE: 80 BPM
CALCULATED P AXIS, ECG09: 33 DEGREES
CALCULATED R AXIS, ECG10: -40 DEGREES
DIAGNOSIS, 93000: NORMAL
P-R INTERVAL, ECG05: 188 MS
Q-T INTERVAL, ECG07: 370 MS
QRS DURATION, ECG06: 98 MS
QTC CALCULATION (BEZET), ECG08: 426 MS
VENTRICULAR RATE, ECG03: 80 BPM

## 2019-04-04 PROCEDURE — 99218 HC RM OBSERVATION: CPT

## 2019-04-04 PROCEDURE — 74011250637 HC RX REV CODE- 250/637: Performed by: INTERNAL MEDICINE

## 2019-04-04 RX ORDER — ATORVASTATIN CALCIUM 20 MG/1
20 TABLET, FILM COATED ORAL
Qty: 90 TAB | Refills: 3 | Status: SHIPPED | OUTPATIENT
Start: 2019-04-04 | End: 2021-07-05

## 2019-04-04 RX ORDER — AMLODIPINE BESYLATE 5 MG/1
5 TABLET ORAL DAILY
Qty: 30 TAB | Refills: 5 | Status: SHIPPED | OUTPATIENT
Start: 2019-04-04 | End: 2021-07-07

## 2019-04-04 RX ORDER — NITROGLYCERIN 0.4 MG/1
0.4 TABLET SUBLINGUAL AS NEEDED
Qty: 1 BOTTLE | Refills: 3 | Status: SHIPPED | OUTPATIENT
Start: 2019-04-04 | End: 2021-07-05

## 2019-04-04 RX ADMIN — AMLODIPINE BESYLATE 5 MG: 5 TABLET ORAL at 07:53

## 2019-04-04 RX ADMIN — TICAGRELOR 90 MG: 90 TABLET ORAL at 07:52

## 2019-04-04 RX ADMIN — ASPIRIN 81 MG CHEWABLE TABLET 81 MG: 81 TABLET CHEWABLE at 07:53

## 2019-04-04 RX ADMIN — METOPROLOL TARTRATE 25 MG: 25 TABLET ORAL at 07:53

## 2019-04-04 NOTE — PROGRESS NOTES
0755hrs:  Patient pacing between hallway and room asking about discharge. Per patient \"my wife is on her way. Let's get this IV out. \"  Patient is demanding. RN instructed patient that she would be in with his morning medications and discharge instructions as soon as they were available. RN attempted to review medications with patient, Dyan Benitez I know about those. \"  Education provided on medications and reviewed again with discharge instructions. PCT removed IV; patient had previously removed cardiac telemetry at 0714hrs. Per patient \"I've got to go. My wife has a meeting this morning and I don't want to have to sit in the car and wait for her. \"   RN attempted to provide education on cardiac cath site infection prevention. No teach back. Patient stated \"yeah ok. \"    0810hrs:  Pt discharged

## 2019-04-04 NOTE — PROGRESS NOTES
2000- Nitro patch removed from patient's chest.    2130- Patient c/o chest pain started again 8/10 substernal chest pain. EKG prior to SL Nitro. Tylenol for arm pain r/t infiltrated IV    2200- CP persists 2/10.  Continue to monitor    2300- Patient sleeping

## 2019-04-04 NOTE — DISCHARGE SUMMARY
Giorgi Lerner 2906 SUMMARY    Name:  Charo Quiros  MR#:  [id number]  :  1950  ACCOUNT #:  [de-identified]  ADMIT DATE:  2019  DISCHARGE DATE:  2019      HOSPITAL COURSE:  The patient was admitted on 2019 with unstable angina. Enzymes were negative. Went to cath lab yesterday morning, was found to have severe stenosis of the early mid LAD, partially in-stent, partially native proximal to the previously deployed stent. This area was ballooned and re-stented with an excellent angiographic result with a 3.5 x 26-mm drug eluting stent. He did have a diagonal and that came off in the diseased region and flow was temporarily compromised, caused some chest pain, but this subsided, doing well this morning, ready for discharge home. DISCHARGE PHYSICAL EXAMINATION: His physical exam was stable and unchanged from admission. DISCHARGE MEDICATIONS:  He will be discharged home on the following medications:  1. Amlodipine 5 mg daily. 2.  Nitroglycerin sublingually p.r.n.  3.  Ticagrelor 90 mg twice a day. 4.  Tamsulosin 0.4 nightly. 5.  Metoprolol succinate 25 mg daily. 6.  Finasteride 5 mg nightly. 7.  Aspirin 81 mg daily. 8.  Atorvastatin 20 mg daily. DISCHARGE INSTRUCTIONS:  Follow up with me in 2 weeks. TOTAL TIME FOR DISCHARGE:  30 minutes.       Martha Quezada MD [id number]  D:  2019 7:52  T:  2019 10:55  JOB #:  [id number]

## 2019-04-04 NOTE — PROGRESS NOTES
2000- Nitro patch removed from patient's chest. 
 
2130- Patient c/o chest pain started again 8/10 substernal chest pain. EKG prior to SL Nitro. Tylenol for arm pain r/t infiltrated IV 
 
2200- CP persists 2/10. Continue to monitor 2300- Patient sleeping 
 
0330- Unpleased with Q4hr vital signs. 4153- Patient angry with being woken up again for VS. Took himself off telemetry and went to bathroom. Agreed to wait to speak with MD before leaving but remains off tele and noncompliant otherwise.

## 2019-05-28 ENCOUNTER — TELEPHONE (OUTPATIENT)
Dept: CARDIAC REHAB | Age: 69
End: 2019-05-28

## 2019-05-28 NOTE — TELEPHONE ENCOUNTER
5/28/2019 Cardiac Rehab: Called Mr. Kemi Siegel  to discuss participation in the Cardiac Rehab Program following a stent on 4/3/19. Spoke with his wife Zoie Aquino. She took the number to IP and OP and will ask Kemi Siegel to call us back this week. IP cardiac rehab is aware. If no response will follow the week of 6/4/19.  Anette Hale RN

## 2019-05-30 ENCOUNTER — TELEPHONE (OUTPATIENT)
Dept: CARDIAC REHAB | Age: 69
End: 2019-05-30

## 2019-05-30 NOTE — TELEPHONE ENCOUNTER
5/30/2019 Cardiac Rehab: Second call placed to Becki Pino and message left. Will follow the week of 6/6/19. Stephen San RN    5/28/2019 Cardiac Rehab: Called Mr. Becki Pino  to discuss participation in the Cardiac Rehab Program following a stent on 4/3/19. Spoke with his wife Jn Kang. She took the number to IP and OP and will ask Becki Pino to call us back this week. IP cardiac rehab is aware. If no response will follow the week of 6/4/19.  Stephen San RN

## 2019-06-05 ENCOUNTER — TELEPHONE (OUTPATIENT)
Dept: CARDIAC REHAB | Age: 69
End: 2019-06-05

## 2019-06-05 NOTE — TELEPHONE ENCOUNTER
6/5/2019 Cardiac Rehab: Called Mr. Becki Pino  to discuss participation in the Cardiac Rehab Program . Left voicemail message and gave # to call OP directly to schedule appt. Opal Kim RN        5/30/2019 Cardiac Rehab: Second call placed to Becki Pino and message left. Will follow the week of 6/6/19. Stephen San RN     8/94/0535 [facility] Rehab: Edward Nelson discuss participation in the Cardiac Rehab Program following a stent on 4/3/19. Spoke with his wife Jn Kang. She took the number to IP and OP and will [address] call us back this week. IP cardiac rehab is aware.  If no response will follow the week of 6/4/19. Carolyn Fuentes RN

## 2020-06-22 ENCOUNTER — HOSPITAL ENCOUNTER (EMERGENCY)
Age: 70
Discharge: HOME | End: 2020-06-22
Attending: EMERGENCY MEDICINE
Payer: COMMERCIAL

## 2020-06-22 ENCOUNTER — APPOINTMENT (OUTPATIENT)
Dept: GENERAL RADIOLOGY | Age: 70
End: 2020-06-22
Attending: EMERGENCY MEDICINE
Payer: COMMERCIAL

## 2020-06-22 VITALS
BODY MASS INDEX: 40.69 KG/M2 | TEMPERATURE: 98.2 F | HEART RATE: 71 BPM | DIASTOLIC BLOOD PRESSURE: 88 MMHG | WEIGHT: 274.69 LBS | SYSTOLIC BLOOD PRESSURE: 158 MMHG | RESPIRATION RATE: 20 BRPM | HEIGHT: 69 IN | OXYGEN SATURATION: 94 %

## 2020-06-22 DIAGNOSIS — R06.09 DOE (DYSPNEA ON EXERTION): Primary | ICD-10-CM

## 2020-06-22 LAB
ALBUMIN SERPL-MCNC: 3.2 G/DL (ref 3.5–5)
ALBUMIN/GLOB SERPL: 0.9 {RATIO} (ref 1.1–2.2)
ALP SERPL-CCNC: 60 U/L (ref 45–117)
ALT SERPL-CCNC: 34 U/L (ref 12–78)
ANION GAP SERPL CALC-SCNC: 6 MMOL/L (ref 5–15)
APTT PPP: 27.2 SEC (ref 22.1–32)
AST SERPL-CCNC: 22 U/L (ref 15–37)
ATRIAL RATE: 69 BPM
BASOPHILS # BLD: 0 K/UL (ref 0–0.1)
BASOPHILS NFR BLD: 1 % (ref 0–1)
BILIRUB SERPL-MCNC: 0.3 MG/DL (ref 0.2–1)
BNP SERPL-MCNC: 283 PG/ML (ref 0–125)
BUN SERPL-MCNC: 14 MG/DL (ref 6–20)
BUN/CREAT SERPL: 16 (ref 12–20)
CALCIUM SERPL-MCNC: 8.3 MG/DL (ref 8.5–10.1)
CALCULATED P AXIS, ECG09: 39 DEGREES
CALCULATED R AXIS, ECG10: -39 DEGREES
CALCULATED T AXIS, ECG11: 4 DEGREES
CHLORIDE SERPL-SCNC: 106 MMOL/L (ref 97–108)
CO2 SERPL-SCNC: 29 MMOL/L (ref 21–32)
COMMENT, HOLDF: NORMAL
CREAT SERPL-MCNC: 0.89 MG/DL (ref 0.7–1.3)
DIAGNOSIS, 93000: NORMAL
DIFFERENTIAL METHOD BLD: ABNORMAL
EOSINOPHIL # BLD: 0.5 K/UL (ref 0–0.4)
EOSINOPHIL NFR BLD: 8 % (ref 0–7)
ERYTHROCYTE [DISTWIDTH] IN BLOOD BY AUTOMATED COUNT: 12.3 % (ref 11.5–14.5)
GLOBULIN SER CALC-MCNC: 3.4 G/DL (ref 2–4)
GLUCOSE SERPL-MCNC: 98 MG/DL (ref 65–100)
HCT VFR BLD AUTO: 40.4 % (ref 36.6–50.3)
HGB BLD-MCNC: 13.4 G/DL (ref 12.1–17)
IMM GRANULOCYTES # BLD AUTO: 0 K/UL (ref 0–0.04)
IMM GRANULOCYTES NFR BLD AUTO: 1 % (ref 0–0.5)
INR PPP: 1 (ref 0.9–1.1)
LYMPHOCYTES # BLD: 1.6 K/UL (ref 0.8–3.5)
LYMPHOCYTES NFR BLD: 27 % (ref 12–49)
MCH RBC QN AUTO: 34.7 PG (ref 26–34)
MCHC RBC AUTO-ENTMCNC: 33.2 G/DL (ref 30–36.5)
MCV RBC AUTO: 104.7 FL (ref 80–99)
MONOCYTES # BLD: 0.8 K/UL (ref 0–1)
MONOCYTES NFR BLD: 14 % (ref 5–13)
NEUTS SEG # BLD: 2.8 K/UL (ref 1.8–8)
NEUTS SEG NFR BLD: 49 % (ref 32–75)
NRBC # BLD: 0 K/UL (ref 0–0.01)
NRBC BLD-RTO: 0 PER 100 WBC
P-R INTERVAL, ECG05: 202 MS
PLATELET # BLD AUTO: 195 K/UL (ref 150–400)
PMV BLD AUTO: 10.7 FL (ref 8.9–12.9)
POTASSIUM SERPL-SCNC: 4.3 MMOL/L (ref 3.5–5.1)
PROT SERPL-MCNC: 6.6 G/DL (ref 6.4–8.2)
PROTHROMBIN TIME: 9.6 SEC (ref 9–11.1)
Q-T INTERVAL, ECG07: 380 MS
QRS DURATION, ECG06: 86 MS
QTC CALCULATION (BEZET), ECG08: 407 MS
RBC # BLD AUTO: 3.86 M/UL (ref 4.1–5.7)
SAMPLES BEING HELD,HOLD: NORMAL
SODIUM SERPL-SCNC: 141 MMOL/L (ref 136–145)
THERAPEUTIC RANGE,PTTT: NORMAL SECS (ref 58–77)
TROPONIN I SERPL-MCNC: <0.05 NG/ML
TROPONIN I SERPL-MCNC: <0.05 NG/ML
VENTRICULAR RATE, ECG03: 69 BPM
WBC # BLD AUTO: 5.7 K/UL (ref 4.1–11.1)

## 2020-06-22 PROCEDURE — 71046 X-RAY EXAM CHEST 2 VIEWS: CPT

## 2020-06-22 PROCEDURE — 74011250637 HC RX REV CODE- 250/637: Performed by: EMERGENCY MEDICINE

## 2020-06-22 PROCEDURE — 85730 THROMBOPLASTIN TIME PARTIAL: CPT

## 2020-06-22 PROCEDURE — 99284 EMERGENCY DEPT VISIT MOD MDM: CPT

## 2020-06-22 PROCEDURE — 87635 SARS-COV-2 COVID-19 AMP PRB: CPT

## 2020-06-22 PROCEDURE — 83880 ASSAY OF NATRIURETIC PEPTIDE: CPT

## 2020-06-22 PROCEDURE — 93005 ELECTROCARDIOGRAM TRACING: CPT

## 2020-06-22 PROCEDURE — 85610 PROTHROMBIN TIME: CPT

## 2020-06-22 PROCEDURE — 36415 COLL VENOUS BLD VENIPUNCTURE: CPT

## 2020-06-22 PROCEDURE — 85025 COMPLETE CBC W/AUTO DIFF WBC: CPT

## 2020-06-22 PROCEDURE — 80053 COMPREHEN METABOLIC PANEL: CPT

## 2020-06-22 PROCEDURE — 84484 ASSAY OF TROPONIN QUANT: CPT

## 2020-06-22 RX ORDER — LOVASTATIN 10 MG/1
1 TABLET ORAL AT BEDTIME
Status: ON HOLD | COMMUNITY
End: 2021-07-05

## 2020-06-22 RX ORDER — HYDROCHLOROTHIAZIDE 12.5 MG/1
12.5 TABLET ORAL DAILY
COMMUNITY
Start: 2019-04-11 | End: 2021-07-05

## 2020-06-22 RX ORDER — ALBUTEROL SULFATE 90 UG/1
2 AEROSOL, METERED RESPIRATORY (INHALATION)
Status: COMPLETED | OUTPATIENT
Start: 2020-06-22 | End: 2020-06-22

## 2020-06-22 RX ORDER — CLOPIDOGREL BISULFATE 75 MG/1
75 TABLET ORAL DAILY
COMMUNITY
Start: 2019-04-08 | End: 2021-07-05

## 2020-06-22 RX ADMIN — ALBUTEROL SULFATE 2 PUFF: 90 AEROSOL, METERED RESPIRATORY (INHALATION) at 08:57

## 2020-06-22 NOTE — DISCHARGE INSTRUCTIONS
Patient Education        Shortness of Breath: Care Instructions  Your Care Instructions  Shortness of breath has many causes. Sometimes conditions such as anxiety can lead to shortness of breath. Some people get mild shortness of breath when they exercise. Trouble breathing also can be a symptom of a serious problem, such as asthma, lung disease, emphysema, heart problems, and pneumonia. If your shortness of breath continues, you may need tests and treatment. Watch for any changes in your breathing and other symptoms. Follow-up care is a key part of your treatment and safety. Be sure to make and go to all appointments, and call your doctor if you are having problems. It's also a good idea to know your test results and keep a list of the medicines you take. How can you care for yourself at home? · Do not smoke or allow others to smoke around you. If you need help quitting, talk to your doctor about stop-smoking programs and medicines. These can increase your chances of quitting for good. · Get plenty of rest and sleep. · Take your medicines exactly as prescribed. Call your doctor if you think you are having a problem with your medicine. · Find healthy ways to deal with stress. ? Exercise daily. ? Get plenty of sleep. ? Eat regularly and well. When should you call for help? [id number] anytime you think you may need emergency care. For example, call if:  · You have severe shortness of breath. · You have symptoms of a heart attack. These may include:  ? Chest pain or pressure, or a strange feeling in the chest.  ? Sweating. ? Shortness of breath. ? Nausea or vomiting. ? Pain, pressure, or a strange feeling in the back, neck, jaw, or upper belly or in one or both shoulders or arms. ? Lightheadedness or sudden weakness. ? A fast or irregular heartbeat. After you call 911, the  may tell you to chew 1 adult-strength or 2 to 4 low-dose aspirin. Wait for an ambulance.  Do not try to drive yourself. Call your doctor now or seek immediate medical care if:  · Your shortness of breath gets worse or you start to wheeze. Wheezing is a high-pitched sound when you breathe. · You wake up at night out of breath or have to prop your head up on several pillows to breathe. · You are short of breath after only light activity or while at rest.  Watch closely for changes in your health, and be sure to contact your doctor if:  · You do not get better over the next 1 to 2 days. Where can you learn more? Go to http://subhash-david.info/  Enter S780 in the search box to learn more about \"Shortness of Breath: Care Instructions. \"  Current as of: February 24, 2020               Content Version: 12.5  © 1538-0756 [employer]. Care instructions adapted under license by [employer] (which disclaims liability or warranty for this information). If you have questions about a medical condition or this instruction, always ask your healthcare professional. [place] [address] any warranty or liability for your use of this information. Patient Education        Coronavirus (AMSLB-56): Care Instructions  Overview  The coronavirus disease (COVID-19) is caused by a virus. Symptoms may include a fever, a cough, and shortness of breath. It mainly spreads person-to-person through droplets from coughing and sneezing. The virus also can spread when people are in close contact with someone who is infected. Most people have mild symptoms and can take care of themselves at home. If their symptoms get worse, they may need care in a hospital. There is no medicine to fight the virus. It's important to not spread the virus to others. If you have COVID-19, wear a face cover anytime you are around other people. You need to isolate yourself while you are sick. Your doctor or local public health official will tell you when you no longer need to be isolated.  Leave your home only if you need to get medical care. Follow-up care is a key part of your treatment and safety. Be sure to make and go to all appointments, and call your doctor if you are having problems. It's also a good idea to know your test results and keep a list of the medicines you take. How can you care for yourself at home? · Get extra rest. It can help you feel better. · Drink plenty of fluids. This helps replace fluids lost from fever. Fluids also help ease a scratchy throat. Water, soup, fruit juice, and hot tea with lemon are good choices. · Take acetaminophen (such as Tylenol) to reduce a fever. It may also help with muscle aches. Read and follow all instructions on the label. · Sponge your body with lukewarm water to help with fever. Don't use cold water or ice. · Use petroleum jelly on sore skin. This can help if the skin around your nose and lips becomes sore from rubbing a lot with tissues. Tips for isolation  · Wear a cloth face cover when you are around other people. It can help stop the spread of the virus when you cough or sneeze. · Limit contact with people in your home. If possible, stay in a separate bedroom and use a separate bathroom. · If you have to leave home, avoid crowds and try to stay at least 6 feet away from other people. · Avoid contact with pets and other animals. · Cover your mouth and nose with a tissue when you cough or sneeze. Then throw it in the trash right away. · Wash your hands often, especially after you cough or sneeze. Use soap and water, and scrub for at least 20 seconds. If soap and water aren't available, use an alcohol-based hand . · Don't share personal household items. These include bedding, towels, cups and glasses, and eating utensils. · [address] in the warmest water allowed for the fabric type, and dry it completely. It's okay to wash other people's laundry with yours. · Clean and disinfect your home every day.  Use household  and disinfectant wipes or sprays. Take special care to clean things that you grab with your hands. These include doorknobs, remote controls, phones, and handles on your refrigerator and microwave. And don't forget countertops, tabletops, bathrooms, and computer keyboards. When should you call for help? [id number] anytime you think you may need emergency care. For example, call if you have life-threatening symptoms, such as:  · You have severe trouble breathing. (You can't talk at all.)  · You have constant chest pain or pressure. · You are severely dizzy or lightheaded. · You are confused or can't think clearly. · Your face and lips have a blue color. · You pass out (lose consciousness) or are very hard to wake up. Call your doctor now or seek immediate medical care if:  · You have moderate trouble breathing. (You can't speak a full sentence.)  · You are coughing up blood (more than about 1 teaspoon). · You have signs of low blood pressure. These include feeling lightheaded; being too weak to stand; and having cold, pale, clammy skin. Watch closely for changes in your health, and be sure to contact your doctor if:  · Your symptoms get worse. · You are not getting better as expected. Call before you go to the doctor's office. Follow their instructions. And wear a cloth face cover. Current as of: May 8, 2020               Content Version: 12.5  © 8052-4103 [employer]. Care instructions adapted under license by [employer] (which disclaims liability or warranty for this information). If you have questions about a medical condition or this instruction, always ask your healthcare professional. [place] [address] any warranty or liability for your use of this information.

## 2020-06-22 NOTE — ED PROVIDER NOTES
The history is provided by the patient. Shortness of Breath   This is a new problem. The average episode lasts 2 days. The problem occurs continuously. The current episode started 2 days ago. The problem has not changed since onset. Associated symptoms include PND and orthopnea. Pertinent negatives include no fever, no headaches, no coryza, no rhinorrhea, no sore throat, no swollen glands, no ear pain, no neck pain, no cough, no sputum production, no hemoptysis, no wheezing, no chest pain, no syncope, no vomiting, no abdominal pain, no rash, no leg pain, no leg swelling and no claudication. Risk factors include smoking. He has tried nothing for the symptoms. The treatment provided no relief. He has had prior hospitalizations. He has had prior ED visits. He has had prior ICU admissions. Associated medical issues include CAD and past MI. Associated medical issues do not include asthma, COPD, pneumonia, chronic lung disease, PE, heart failure, DVT or recent surgery. Past Medical History:   Diagnosis Date    CAD (coronary artery disease)     3 stents    Other ill-defined conditions(799.89)     bph    Other ill-defined conditions(799.89)     hypercholesterolemia    Other ill-defined conditions(799.89)     accidental finger amputation       Past Surgical History:   Procedure Laterality Date    CARDIAC SURG PROCEDURE UNLIST      3 stents    HX ORTHOPAEDIC      l sholder surg    HX SHOULDER REPLACEMENT Right          No family history on file.     Social History     Socioeconomic History    Marital status:      Spouse name: Not on file    Number of children: Not on file    Years of education: Not on file    Highest education level: Not on file   Occupational History    Not on file   Social Needs    Financial resource strain: Not on file    Food insecurity     Worry: Not on file     Inability: Not on file    Transportation needs     Medical: Not on file     Non-medical: Not on file   Tobacco Use  Smoking status: Former Smoker     Packs/day: 1.00     Years: 30.00     Pack years: 30.00     Last attempt to quit: 2010     Years since quittin.5    Smokeless tobacco: Never Used   Substance and Sexual Activity    Alcohol use: Yes     Alcohol/week: 4.0 standard drinks     Types: 4 Cans of beer per week     Comment: socially    Drug use: No    Sexual activity: Not on file   Lifestyle    Physical activity     Days per week: Not on file     Minutes per session: Not on file    Stress: Not on file   Relationships    Social connections     Talks on phone: Not on file     Gets together: Not on file     Attends [employer] service: Not on file     Active member of club or organization: Not on file     Attends meetings of clubs or organizations: Not on file     Relationship status: Not on file    Intimate partner violence     Fear of current or ex partner: Not on file     Emotionally abused: Not on file     Physically abused: Not on file     Forced sexual activity: Not on file   Other Topics Concern    Not on file   Social History Narrative    Not on file         ALLERGIES: Pcn [penicillins]    Review of Systems   Constitutional: Negative for activity change, chills and fever. HENT: Negative for ear pain, nosebleeds, rhinorrhea, sore throat, trouble swallowing and voice change. Eyes: Negative for visual disturbance. Respiratory: Positive for shortness of breath. Negative for cough, hemoptysis, sputum production and wheezing. Cardiovascular: Positive for orthopnea and PND. Negative for chest pain, palpitations, claudication, leg swelling and syncope. Gastrointestinal: Negative for abdominal pain, constipation, diarrhea, nausea and vomiting. Genitourinary: Negative for difficulty urinating, dysuria, hematuria and urgency. Musculoskeletal: Negative for back pain, neck pain and neck stiffness. Skin: Negative for color change and rash.    Allergic/Immunologic: Negative for immunocompromised state.   Neurological: Negative for dizziness, seizures, syncope, weakness, light-headedness, numbness and headaches. Psychiatric/Behavioral: Negative for behavioral problems, confusion, hallucinations, self-injury and suicidal ideas. There were no vitals filed for this visit. Physical Exam  Vitals signs and nursing note reviewed. Constitutional:       General: He is not in acute distress. Appearance: He is well-developed. He is not diaphoretic. HENT:      Head: Normocephalic and atraumatic. Eyes:      Pupils: Pupils are equal, round, and reactive to light. Neck:      Musculoskeletal: Normal range of motion and neck supple. Cardiovascular:      Rate and Rhythm: Normal rate and regular rhythm. Heart sounds: Normal heart sounds. No murmur. No friction rub. No gallop. Pulmonary:      Effort: Pulmonary effort is normal. No respiratory distress. Breath sounds: Examination of the right-upper field reveals wheezing. Examination of the left-upper field reveals wheezing. Examination of the right-lower field reveals decreased breath sounds. Examination of the left-lower field reveals decreased breath sounds. Decreased breath sounds and wheezing present. Abdominal:      General: Bowel sounds are normal. There is no distension. Palpations: Abdomen is soft. Tenderness: There is no abdominal tenderness. There is no guarding or rebound. Musculoskeletal: Normal range of motion. Skin:     General: Skin is warm. Findings: No rash. Neurological:      Mental Status: He is alert and oriented to person, place, and time. Psychiatric:         Behavior: Behavior normal.         Thought Content:  Thought content normal.         Judgment: Judgment normal.          [facility]  ED Course as of Jun 22 0959   Mon Jun 22, 2020   0958 2nd trop negative, patient remains in NAD, satting well on RA, no evidence of significant cardiac or pulmonary dysfunction, patient d/w Dr. Brooks Valencia (Cardiology) who will set up outpatient follow up, COVID testing sent, will advise PCP follow up as well, return precautions given. [FD]      ED Course User Index  [FD] Love Torres MD     This is a 71-year-old male with past medical history, review of systems, physical exam as above, presenting with 2 days of shortness of breath, worsened with laying down, and with exertion, as well as chills, patient denies fevers, cough, recent travel or known sick contacts. He states he is recently discontinued his loop diuretic as he has had difficulty timing bathroom trips with work requirements. He states the pedal edema is at baseline. Physical exam is remarkable for well-appearing obese male, in no acute distress, with 1+ pitting edema in the bilateral lower extremities, decreased breath sounds in the bilateral bases, mild wheezes in the bilateral apices, soft nontender abdomen, regular rate and rhythm without murmurs gallops or rubs. Chart review indicates extensive history of coronary artery disease, three-vessel disease. Differential includes viral versus bacterial pneumonia, fluid retention with pulmonary edema, acute coronary syndrome. Plan to obtain CMP, CBC, EKG, chest x-ray, cardiac enzymes, BNP. Will consider swab for COVID-19, reassess, and make a disposition.     Procedures

## 2020-06-23 ENCOUNTER — PATIENT OUTREACH (OUTPATIENT)
Dept: INTERNAL MEDICINE CLINIC | Age: 70
End: 2020-06-23

## 2020-06-23 LAB
SARS-COV-2, COV2NT: NOT DETECTED
SOURCE, COVRS: NORMAL
SPECIMEN SOURCE, FCOV2M: NORMAL

## 2020-06-23 NOTE — PROGRESS NOTES
ED 6/22/2020:  DOLAN  Patient on Cov19 D/C DIEGO Enrollment Report/fu Contact. Left message on voice mail with my contact information for return call. Need to assess for d/c needs post ED or Inpatient Admission. And/or DIEGO enrollment/fu.

## 2020-07-09 ENCOUNTER — PATIENT OUTREACH (OUTPATIENT)
Dept: CASE MANAGEMENT | Age: 70
End: 2020-07-09

## 2020-07-10 NOTE — PROGRESS NOTES
Education Provided due to At Risk Condition:  DOLAN  Patient contacted regarding recent discharge and COVID-19 risk. Discussed COVID-19 related testing which was not done at this time. Test results were not done. Patient informed of results, if available? no    Care Transition Nurse/ Ambulatory Care Manager contacted the patient by telephone to perform post discharge assessment. Verified name and  with patient as identifiers. Patient has following risk factors of: HTN. CTN/ACM reviewed discharge instructions, medical action plan and red flags related to discharge diagnosis. Reviewed and educated them on any new and changed medications related to discharge diagnosis. Advised obtaining a 90-day supply of all daily and as-needed medications. Education provided regarding infection prevention, and signs and symptoms of COVID-19 and when to seek medical attention with patient who verbalized understanding. Discussed exposure protocols and quarantine from [address] you at higher risk for severe illness  and given an opportunity for questions and concerns. The patient agrees to contact the COVID-19 hotline [phone] or PCP office for questions related to their healthcare. CTN/ACM provided contact information for future reference. From CDC: Are you at higher risk for severe illness?  Wash your hands often.  Avoid close contact (6 feet, which is about two arm lengths) with people who are sick.  Put distance between yourself and other people if COVID-19 is spreading in your community.  Clean and disinfect frequently touched surfaces.  Avoid all cruise travel and non-essential air travel.  Call your healthcare professional if you have concerns about COVID-19 and your underlying condition or if you are sick.     For more information on steps you can take to protect yourself, see CDC's How to Protect Yourself      Patient/family/caregiver given information for Cristian Felix and agrees to enroll no    Second contact:  Done. Patient resolved from Transition of Care episode on 7/9/2020  Discussed COVID-19 related testing which was not done at this time. Test results were not done. Patient informed of results, if available? no     Patient/family has been provided the following resources and education related to COVID-19:                         Signs, symptoms and red flags related to COVID-19            [facility] [place] [facility] [place] exposure and quarantine guidelines            [facility] exposure contact - [phone]                          Patient currently reports that the following symptoms have improved:  weakness. No further outreach scheduled with this CTN/ACM/LPN/HC/ MA. Episode of Care resolved. Patient has this CTN/ACM/LPN/HC/MA contact information if future needs arise.

## 2020-11-16 ENCOUNTER — OFFICE VISIT (OUTPATIENT)
Dept: INTERNAL MEDICINE CLINIC | Age: 70
End: 2020-11-16
Payer: MEDICARE

## 2020-11-16 VITALS
HEIGHT: 69 IN | SYSTOLIC BLOOD PRESSURE: 146 MMHG | DIASTOLIC BLOOD PRESSURE: 86 MMHG | RESPIRATION RATE: 16 BRPM | BODY MASS INDEX: 42.06 KG/M2 | HEART RATE: 84 BPM | WEIGHT: 284 LBS | OXYGEN SATURATION: 94 %

## 2020-11-16 DIAGNOSIS — R97.20 ELEVATED PSA: ICD-10-CM

## 2020-11-16 DIAGNOSIS — Z80.0 FH: COLON CANCER: ICD-10-CM

## 2020-11-16 DIAGNOSIS — Z23 ENCOUNTER FOR IMMUNIZATION: ICD-10-CM

## 2020-11-16 DIAGNOSIS — I25.119 CORONARY ARTERY DISEASE INVOLVING NATIVE HEART WITH ANGINA PECTORIS, UNSPECIFIED VESSEL OR LESION TYPE (HCC): ICD-10-CM

## 2020-11-16 DIAGNOSIS — E78.2 MIXED HYPERLIPIDEMIA: ICD-10-CM

## 2020-11-16 DIAGNOSIS — R06.09 DOE (DYSPNEA ON EXERTION): ICD-10-CM

## 2020-11-16 DIAGNOSIS — I10 ESSENTIAL HYPERTENSION: Primary | ICD-10-CM

## 2020-11-16 PROCEDURE — 90732 PPSV23 VACC 2 YRS+ SUBQ/IM: CPT

## 2020-11-16 PROCEDURE — G0009 ADMIN PNEUMOCOCCAL VACCINE: HCPCS

## 2020-11-16 PROCEDURE — 99204 OFFICE O/P NEW MOD 45 MIN: CPT | Performed by: INTERNAL MEDICINE

## 2020-11-16 RX ORDER — PANTOPRAZOLE SODIUM 40 MG/1
TABLET, DELAYED RELEASE ORAL
COMMUNITY
Start: 2020-09-16 | End: 2021-07-05

## 2020-11-16 RX ORDER — FUROSEMIDE 20 MG/1
TABLET ORAL
COMMUNITY
Start: 2020-11-07 | End: 2021-07-07

## 2020-11-16 NOTE — PROGRESS NOTES
Lisa Valentine is a 77 y.o. male    Chief Complaint   Patient presents with    New Patient   [address] Maintenance Due   Topic Date Due    Hepatitis C Screening  1950    DTaP/Tdap/Td series (1 - Tdap) 11/24/1972    Shingrix Vaccine Age 50> (1 of 2) 11/24/2001    Colorectal Cancer Screening Combo  11/24/2001    GLAUCOMA SCREENING Q2Y  11/24/2016    AAA Screening 73-69 YO Male Smoking Patients  11/24/2016    Pneumococcal 65+ years (1 of 1 - PPSV23) 11/24/2016    Lipid Screen  04/02/2020    Flu Vaccine (1) 09/01/2020       Visit Vitals  BP (!) 146/86 (BP 1 Location: Left arm, BP Patient Position: Sitting)   Pulse 84   Resp 16   Ht 5' 9\" (1.753 m)   Wt 284 lb (128.8 kg)   SpO2 94%   BMI 41.94 kg/m²       3 most recent PHQ Screens 11/16/2020   Little interest or pleasure in doing things Not at all   Feeling down, depressed, irritable, or hopeless Not at all   Total Score PHQ 2 0       Fall Risk Assessment, last 12 mths 11/16/2020   Able to walk? Yes   Fall in past 12 months? No       Abuse Screening Questionnaire 11/16/2020   Do you ever feel afraid of your partner? N   Are you in a relationship with someone who physically or mentally threatens you? N   Is it safe for you to go home? Y         1. Have you been to the ER, urgent care clinic since your last visit? Hospitalized since your last visit? No    2. Have you seen or consulted any other health care providers outside of the [address] [place] [address] since your last visit? Include any pap smears or colon screening.  No

## 2020-11-16 NOTE — PROGRESS NOTES
HISTORY OF PRESENT ILLNESS  Darcie Esteban is a 77 y.o. male. HPI  New to me and this practice. I know his wife, Elizabeth Arambula. He has a complicated history. 1. Coronary artery disease, status post stent with Dr. Saul Larson in 2004. Believes it was the left main. Then had another stent in 2005, LAD. Has now been followed by Dr. Woody Gallo. Had a recent echo, results pending. Has had several nuclear stress tests. 2. History of hearing loss of right ear. Wears a hearing aid in the left. 3. Family history of colon cancer, last colonoscopy over ten years ago. 4. History of elevated PSA. Has had biopsy x two with Dr. Chio Ken. Treated for BPH. 5. History of right shoulder replacement and left biceps tendon repair. 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 He smoked for 30 years, has quit for 9. Family History:  Positive for mother with colon cancer in her 46s. Sister with colon cancer. Brother with leukemia. Father had bone cancer. Review of Systems   Constitutional: Positive for malaise/fatigue. Negative for chills, fever and weight loss. Respiratory: Negative for cough, shortness of breath and wheezing. Cardiovascular: Negative for chest pain, palpitations, orthopnea, leg swelling and PND. Gastrointestinal: Negative for abdominal pain, constipation, diarrhea, heartburn, nausea and vomiting. Genitourinary: Positive for frequency. Negative for dysuria. Musculoskeletal: Positive for joint pain. Negative for myalgias. Skin: Negative for rash. Neurological: Negative for dizziness and headaches. Psychiatric/Behavioral: Negative for memory loss. The patient does not have insomnia. All other systems reviewed and are negative. Physical Exam  Vitals signs and nursing note reviewed. Constitutional:       Appearance: He is well-developed. HENT:      Head: Normocephalic and atraumatic.       Right Ear: Tympanic membrane and ear canal normal.      Left Ear: Tympanic membrane and ear canal normal.   Eyes:      Extraocular Movements: Extraocular movements intact. Pupils: Pupils are equal, round, and reactive to light. Neck:      Musculoskeletal: Normal range of motion and neck supple. Thyroid: No thyromegaly. Vascular: No carotid bruit. Cardiovascular:      Rate and Rhythm: Normal rate and regular rhythm. Heart sounds: Normal heart sounds. Pulmonary:      Effort: Pulmonary effort is normal. No respiratory distress. Breath sounds: Normal breath sounds. No wheezing or rales. Abdominal:      General: Bowel sounds are normal. There is no distension. Palpations: Abdomen is soft. There is no mass. Tenderness: There is no abdominal tenderness. Musculoskeletal:      Right lower leg: No edema. Left lower leg: No edema. Lymphadenopathy:      Cervical: No cervical adenopathy. Skin:     General: Skin is warm. Findings: No rash. Neurological:      General: No focal deficit present. Mental Status: He is alert and oriented to person, place, and time. Psychiatric:         Mood and Affect: Mood normal.         Behavior: Behavior normal.         ASSESSMENT and PLAN  Diagnoses and all orders for this visit:    1. Essential hypertension-cont meds    2. Encounter for immunization  -     PNEUMOCOCCAL POLYSACCHARIDE VACCINE, 23-VALENT, ADULT OR IMMUNOSUPPRESSED PT DOSE,    3. Coronary artery disease involving native heart with angina pectoris, unspecified vessel or lesion type ([facility] [address]  -     HEPATITIS C AB; Future    4. Mixed hyperlipidemia-on mevacor  -     TSH 3RD GENERATION; Future    5. FH: colon cancer-strongly advised colonoscopy referred to dr Blane Lucas  6. DOLAN (dyspnea on exertion)  -     CBC WITH AUTOMATED DIFF; Future    7.  Elevated PSA    Get records from dr Teressa Berger  appt in 3mo

## 2020-11-21 LAB
BASOPHILS # BLD AUTO: 0 X10E3/UL (ref 0–0.2)
BASOPHILS NFR BLD AUTO: 1 %
EOSINOPHIL # BLD AUTO: 0.3 X10E3/UL (ref 0–0.4)
EOSINOPHIL NFR BLD AUTO: 5 %
ERYTHROCYTE [DISTWIDTH] IN BLOOD BY AUTOMATED COUNT: 12.3 % (ref 11.6–15.4)
HCT VFR BLD AUTO: 43.3 % (ref 37.5–51)
HCV AB S/CO SERPL IA: <0.1 S/CO RATIO (ref 0–0.9)
HGB BLD-MCNC: 14.5 G/DL (ref 13–17.7)
IMM GRANULOCYTES # BLD AUTO: 0 X10E3/UL (ref 0–0.1)
IMM GRANULOCYTES NFR BLD AUTO: 0 %
LYMPHOCYTES # BLD AUTO: 1.2 X10E3/UL (ref 0.7–3.1)
LYMPHOCYTES NFR BLD AUTO: 20 %
MCH RBC QN AUTO: 34.9 PG (ref 26.6–33)
MCHC RBC AUTO-ENTMCNC: 33.5 G/DL (ref 31.5–35.7)
MCV RBC AUTO: 104 FL (ref 79–97)
MONOCYTES # BLD AUTO: 0.9 X10E3/UL (ref 0.1–0.9)
MONOCYTES NFR BLD AUTO: 15 %
NEUTROPHILS # BLD AUTO: 3.7 X10E3/UL (ref 1.4–7)
NEUTROPHILS NFR BLD AUTO: 59 %
PLATELET # BLD AUTO: 180 X10E3/UL (ref 150–450)
RBC # BLD AUTO: 4.15 X10E6/UL (ref 4.14–5.8)
TSH SERPL DL<=0.005 MIU/L-ACNC: 1.07 UIU/ML (ref 0.45–4.5)
WBC # BLD AUTO: 6.3 X10E3/UL (ref 3.4–10.8)

## 2021-05-10 LAB — SARS-COV-2, NAA: NEGATIVE

## 2021-07-05 ENCOUNTER — APPOINTMENT (OUTPATIENT)
Dept: GENERAL RADIOLOGY | Age: 71
DRG: 281 | End: 2021-07-05
Attending: EMERGENCY MEDICINE
Payer: MEDICARE

## 2021-07-05 ENCOUNTER — HOSPITAL ENCOUNTER (INPATIENT)
Age: 71
LOS: 1 days | Discharge: HOME | DRG: 281 | End: 2021-07-07
Attending: EMERGENCY MEDICINE | Admitting: INTERNAL MEDICINE
Payer: MEDICARE

## 2021-07-05 DIAGNOSIS — R07.9 CHEST PAIN, UNSPECIFIED TYPE: Primary | ICD-10-CM

## 2021-07-05 DIAGNOSIS — D64.9 ANEMIA, UNSPECIFIED TYPE: ICD-10-CM

## 2021-07-05 DIAGNOSIS — I25.10 CORONARY ARTERY DISEASE INVOLVING NATIVE HEART, UNSPECIFIED VESSEL OR LESION TYPE, UNSPECIFIED WHETHER ANGINA PRESENT: ICD-10-CM

## 2021-07-05 LAB
ALBUMIN SERPL-MCNC: 3.3 G/DL (ref 3.5–5)
ALBUMIN/GLOB SERPL: 1 {RATIO} (ref 1.1–2.2)
ALP SERPL-CCNC: 90 U/L (ref 45–117)
ALT SERPL-CCNC: 29 U/L (ref 12–78)
ANION GAP SERPL CALC-SCNC: 10 MMOL/L (ref 5–15)
APTT PPP: 38.3 SEC (ref 22.1–31)
APTT PPP: 46.4 SEC (ref 22.1–31)
AST SERPL-CCNC: 20 U/L (ref 15–37)
ATRIAL RATE: 76 BPM
ATRIAL RATE: 89 BPM
BASOPHILS # BLD: 0 K/UL (ref 0–0.1)
BASOPHILS NFR BLD: 0 % (ref 0–1)
BILIRUB SERPL-MCNC: 0.2 MG/DL (ref 0.2–1)
BNP SERPL-MCNC: 184 PG/ML (ref 0–125)
BUN SERPL-MCNC: 16 MG/DL (ref 6–20)
BUN/CREAT SERPL: 15 (ref 12–20)
CALCIUM SERPL-MCNC: 8.5 MG/DL (ref 8.5–10.1)
CALCULATED P AXIS, ECG09: 12 DEGREES
CALCULATED P AXIS, ECG09: 2 DEGREES
CALCULATED R AXIS, ECG10: -27 DEGREES
CALCULATED R AXIS, ECG10: -40 DEGREES
CALCULATED T AXIS, ECG11: 33 DEGREES
CALCULATED T AXIS, ECG11: 38 DEGREES
CHLORIDE SERPL-SCNC: 103 MMOL/L (ref 97–108)
CO2 SERPL-SCNC: 26 MMOL/L (ref 21–32)
CREAT SERPL-MCNC: 1.05 MG/DL (ref 0.7–1.3)
D DIMER PPP FEU-MCNC: 0.55 MG/L FEU (ref 0–0.65)
DIAGNOSIS, 93000: NORMAL
DIAGNOSIS, 93000: NORMAL
DIFFERENTIAL METHOD BLD: ABNORMAL
EOSINOPHIL # BLD: 0.7 K/UL (ref 0–0.4)
EOSINOPHIL NFR BLD: 9 % (ref 0–7)
ERYTHROCYTE [DISTWIDTH] IN BLOOD BY AUTOMATED COUNT: 13.5 % (ref 11.5–14.5)
ETHANOL SERPL-MCNC: <10 MG/DL
FERRITIN SERPL-MCNC: 13 NG/ML (ref 26–388)
FOLATE SERPL-MCNC: 18.2 NG/ML (ref 5–21)
GLOBULIN SER CALC-MCNC: 3.2 G/DL (ref 2–4)
GLUCOSE SERPL-MCNC: 207 MG/DL (ref 65–100)
HCT VFR BLD AUTO: 27.1 % (ref 36.6–50.3)
HEMOCCULT STL QL: NEGATIVE
HGB BLD-MCNC: 8.3 G/DL (ref 12.1–17)
IMM GRANULOCYTES # BLD AUTO: 0.1 K/UL
IMM GRANULOCYTES NFR BLD AUTO: 1 %
IRON SATN MFR SERPL: 8 % (ref 20–50)
IRON SERPL-MCNC: 26 UG/DL (ref 35–150)
LYMPHOCYTES # BLD: 1.6 K/UL (ref 0.8–3.5)
LYMPHOCYTES NFR BLD: 21 % (ref 12–49)
MCH RBC QN AUTO: 30.5 PG (ref 26–34)
MCHC RBC AUTO-ENTMCNC: 30.6 G/DL (ref 30–36.5)
MCV RBC AUTO: 99.6 FL (ref 80–99)
MONOCYTES # BLD: 0.9 K/UL (ref 0–1)
MONOCYTES NFR BLD: 12 % (ref 5–13)
NEUTS SEG # BLD: 4.4 K/UL (ref 1.8–8)
NEUTS SEG NFR BLD: 57 % (ref 32–75)
NRBC # BLD: 0 K/UL (ref 0–0.01)
NRBC BLD-RTO: 0 PER 100 WBC
P-R INTERVAL, ECG05: 152 MS
P-R INTERVAL, ECG05: 156 MS
PLATELET # BLD AUTO: 222 K/UL (ref 150–400)
PMV BLD AUTO: 11.2 FL (ref 8.9–12.9)
POTASSIUM SERPL-SCNC: 3.5 MMOL/L (ref 3.5–5.1)
PROT SERPL-MCNC: 6.5 G/DL (ref 6.4–8.2)
Q-T INTERVAL, ECG07: 386 MS
Q-T INTERVAL, ECG07: 400 MS
QRS DURATION, ECG06: 104 MS
QRS DURATION, ECG06: 92 MS
QTC CALCULATION (BEZET), ECG08: 450 MS
QTC CALCULATION (BEZET), ECG08: 469 MS
RBC # BLD AUTO: 2.72 M/UL (ref 4.1–5.7)
RBC MORPH BLD: ABNORMAL
SODIUM SERPL-SCNC: 139 MMOL/L (ref 136–145)
THERAPEUTIC RANGE,PTTT: ABNORMAL SECS (ref 58–77)
THERAPEUTIC RANGE,PTTT: ABNORMAL SECS (ref 58–77)
TIBC SERPL-MCNC: 338 UG/DL (ref 250–450)
TROPONIN I SERPL-MCNC: 0.22 NG/ML
TROPONIN I SERPL-MCNC: 0.25 NG/ML
TROPONIN I SERPL-MCNC: 0.31 NG/ML
TROPONIN I SERPL-MCNC: <0.05 NG/ML
VENTRICULAR RATE, ECG03: 76 BPM
VENTRICULAR RATE, ECG03: 89 BPM
VIT B12 SERPL-MCNC: 391 PG/ML (ref 193–986)
WBC # BLD AUTO: 7.7 K/UL (ref 4.1–11.1)

## 2021-07-05 PROCEDURE — 74011250637 HC RX REV CODE- 250/637: Performed by: FAMILY MEDICINE

## 2021-07-05 PROCEDURE — 80053 COMPREHEN METABOLIC PANEL: CPT

## 2021-07-05 PROCEDURE — 99218 HC RM OBSERVATION: CPT

## 2021-07-05 PROCEDURE — 74011000250 HC RX REV CODE- 250: Performed by: EMERGENCY MEDICINE

## 2021-07-05 PROCEDURE — 83880 ASSAY OF NATRIURETIC PEPTIDE: CPT

## 2021-07-05 PROCEDURE — 84484 ASSAY OF TROPONIN QUANT: CPT

## 2021-07-05 PROCEDURE — 96365 THER/PROPH/DIAG IV INF INIT: CPT

## 2021-07-05 PROCEDURE — 85025 COMPLETE CBC W/AUTO DIFF WBC: CPT

## 2021-07-05 PROCEDURE — 74011250636 HC RX REV CODE- 250/636: Performed by: INTERNAL MEDICINE

## 2021-07-05 PROCEDURE — 93005 ELECTROCARDIOGRAM TRACING: CPT

## 2021-07-05 PROCEDURE — 82077 ASSAY SPEC XCP UR&BREATH IA: CPT

## 2021-07-05 PROCEDURE — 82746 ASSAY OF FOLIC ACID SERUM: CPT

## 2021-07-05 PROCEDURE — 96366 THER/PROPH/DIAG IV INF ADDON: CPT

## 2021-07-05 PROCEDURE — 85730 THROMBOPLASTIN TIME PARTIAL: CPT

## 2021-07-05 PROCEDURE — 36415 COLL VENOUS BLD VENIPUNCTURE: CPT

## 2021-07-05 PROCEDURE — 85379 FIBRIN DEGRADATION QUANT: CPT

## 2021-07-05 PROCEDURE — 71045 X-RAY EXAM CHEST 1 VIEW: CPT

## 2021-07-05 PROCEDURE — 83550 IRON BINDING TEST: CPT

## 2021-07-05 PROCEDURE — 82607 VITAMIN B-12: CPT

## 2021-07-05 PROCEDURE — 82728 ASSAY OF FERRITIN: CPT

## 2021-07-05 PROCEDURE — 94760 N-INVAS EAR/PLS OXIMETRY 1: CPT

## 2021-07-05 PROCEDURE — 99285 EMERGENCY DEPT VISIT HI MDM: CPT

## 2021-07-05 PROCEDURE — 74011250636 HC RX REV CODE- 250/636: Performed by: EMERGENCY MEDICINE

## 2021-07-05 PROCEDURE — 82272 OCCULT BLD FECES 1-3 TESTS: CPT

## 2021-07-05 PROCEDURE — 74011250637 HC RX REV CODE- 250/637: Performed by: EMERGENCY MEDICINE

## 2021-07-05 RX ORDER — FINASTERIDE 5 MG/1
5 TABLET, FILM COATED ORAL DAILY
Status: DISCONTINUED | OUTPATIENT
Start: 2021-07-05 | End: 2021-07-05

## 2021-07-05 RX ORDER — CLOPIDOGREL BISULFATE 75 MG/1
75 TABLET ORAL DAILY
Status: DISCONTINUED | OUTPATIENT
Start: 2021-07-05 | End: 2021-07-05

## 2021-07-05 RX ORDER — PANTOPRAZOLE SODIUM 40 MG/1
40 TABLET, DELAYED RELEASE ORAL
Status: DISCONTINUED | OUTPATIENT
Start: 2021-07-05 | End: 2021-07-05

## 2021-07-05 RX ORDER — HEPARIN SODIUM 1000 [USP'U]/ML
4000 INJECTION, SOLUTION INTRAVENOUS; SUBCUTANEOUS ONCE
Status: COMPLETED | OUTPATIENT
Start: 2021-07-05 | End: 2021-07-05

## 2021-07-05 RX ORDER — ACETAMINOPHEN 650 MG/1
650 SUPPOSITORY RECTAL
Status: DISCONTINUED | OUTPATIENT
Start: 2021-07-05 | End: 2021-07-07 | Stop reason: HOSPADM

## 2021-07-05 RX ORDER — ATORVASTATIN CALCIUM 40 MG/1
40 TABLET, FILM COATED ORAL
Status: DISCONTINUED | OUTPATIENT
Start: 2021-07-05 | End: 2021-07-07 | Stop reason: HOSPADM

## 2021-07-05 RX ORDER — METOPROLOL SUCCINATE 50 MG/1
50 TABLET, EXTENDED RELEASE ORAL
Status: DISCONTINUED | OUTPATIENT
Start: 2021-07-05 | End: 2021-07-07 | Stop reason: HOSPADM

## 2021-07-05 RX ORDER — NITROGLYCERIN 0.4 MG/1
0.4 TABLET SUBLINGUAL
Status: COMPLETED | OUTPATIENT
Start: 2021-07-05 | End: 2021-07-05

## 2021-07-05 RX ORDER — HEPARIN SODIUM 1000 [USP'U]/ML
2000 INJECTION, SOLUTION INTRAVENOUS; SUBCUTANEOUS ONCE
Status: COMPLETED | OUTPATIENT
Start: 2021-07-05 | End: 2021-07-05

## 2021-07-05 RX ORDER — LANOLIN ALCOHOL/MO/W.PET/CERES
1 CREAM (GRAM) TOPICAL
Status: DISCONTINUED | OUTPATIENT
Start: 2021-07-06 | End: 2021-07-07 | Stop reason: HOSPADM

## 2021-07-05 RX ORDER — NITROGLYCERIN 0.4 MG/1
0.4 TABLET SUBLINGUAL AS NEEDED
Status: DISCONTINUED | OUTPATIENT
Start: 2021-07-05 | End: 2021-07-07 | Stop reason: HOSPADM

## 2021-07-05 RX ORDER — HEPARIN SODIUM 10000 [USP'U]/100ML
7-25 INJECTION, SOLUTION INTRAVENOUS
Status: DISCONTINUED | OUTPATIENT
Start: 2021-07-05 | End: 2021-07-07

## 2021-07-05 RX ORDER — SODIUM CHLORIDE 0.9 % (FLUSH) 0.9 %
5-40 SYRINGE (ML) INJECTION EVERY 8 HOURS
Status: DISCONTINUED | OUTPATIENT
Start: 2021-07-05 | End: 2021-07-07 | Stop reason: HOSPADM

## 2021-07-05 RX ORDER — HEPARIN SODIUM 5000 [USP'U]/ML
4000 INJECTION, SOLUTION INTRAVENOUS; SUBCUTANEOUS ONCE
Status: COMPLETED | OUTPATIENT
Start: 2021-07-05 | End: 2021-07-05

## 2021-07-05 RX ORDER — GUAIFENESIN 100 MG/5ML
324 LIQUID (ML) ORAL
Status: COMPLETED | OUTPATIENT
Start: 2021-07-05 | End: 2021-07-05

## 2021-07-05 RX ORDER — ACETAMINOPHEN 325 MG/1
650 TABLET ORAL
Status: COMPLETED | OUTPATIENT
Start: 2021-07-05 | End: 2021-07-05

## 2021-07-05 RX ORDER — TAMSULOSIN HYDROCHLORIDE 0.4 MG/1
0.4 CAPSULE ORAL
Status: DISCONTINUED | OUTPATIENT
Start: 2021-07-05 | End: 2021-07-05

## 2021-07-05 RX ORDER — HEPARIN SODIUM 1000 [USP'U]/ML
60 INJECTION, SOLUTION INTRAVENOUS; SUBCUTANEOUS ONCE
Status: DISCONTINUED | OUTPATIENT
Start: 2021-07-05 | End: 2021-07-05

## 2021-07-05 RX ORDER — ATORVASTATIN CALCIUM 20 MG/1
20 TABLET, FILM COATED ORAL
Status: DISCONTINUED | OUTPATIENT
Start: 2021-07-05 | End: 2021-07-05

## 2021-07-05 RX ORDER — SODIUM CHLORIDE 0.9 % (FLUSH) 0.9 %
5-40 SYRINGE (ML) INJECTION AS NEEDED
Status: DISCONTINUED | OUTPATIENT
Start: 2021-07-05 | End: 2021-07-07 | Stop reason: HOSPADM

## 2021-07-05 RX ORDER — ATORVASTATIN CALCIUM 40 MG/1
40 TABLET, FILM COATED ORAL
COMMUNITY

## 2021-07-05 RX ORDER — ACETAMINOPHEN 325 MG/1
650 TABLET ORAL
Status: DISCONTINUED | OUTPATIENT
Start: 2021-07-05 | End: 2021-07-07 | Stop reason: HOSPADM

## 2021-07-05 RX ORDER — GUAIFENESIN 100 MG/5ML
81 LIQUID (ML) ORAL DAILY
Status: DISCONTINUED | OUTPATIENT
Start: 2021-07-05 | End: 2021-07-07 | Stop reason: HOSPADM

## 2021-07-05 RX ORDER — ENOXAPARIN SODIUM 100 MG/ML
40 INJECTION SUBCUTANEOUS DAILY
Status: DISCONTINUED | OUTPATIENT
Start: 2021-07-05 | End: 2021-07-05

## 2021-07-05 RX ADMIN — METOPROLOL SUCCINATE 50 MG: 50 TABLET, EXTENDED RELEASE ORAL at 21:13

## 2021-07-05 RX ADMIN — HEPARIN SODIUM 2000 UNITS: 1000 INJECTION INTRAVENOUS; SUBCUTANEOUS at 12:37

## 2021-07-05 RX ADMIN — ASPIRIN 81 MG: 81 TABLET, CHEWABLE ORAL at 08:17

## 2021-07-05 RX ADMIN — ATORVASTATIN CALCIUM 40 MG: 40 TABLET, FILM COATED ORAL at 21:13

## 2021-07-05 RX ADMIN — HEPARIN SODIUM 4000 UNITS: 5000 INJECTION INTRAVENOUS; SUBCUTANEOUS at 08:55

## 2021-07-05 RX ADMIN — HEPARIN SODIUM 4000 UNITS: 1000 INJECTION INTRAVENOUS; SUBCUTANEOUS at 19:08

## 2021-07-05 RX ADMIN — ASPIRIN 324 MG: 81 TABLET, CHEWABLE ORAL at 00:30

## 2021-07-05 RX ADMIN — SODIUM CHLORIDE 500 ML: 9 INJECTION, SOLUTION INTRAVENOUS at 00:34

## 2021-07-05 RX ADMIN — NITROGLYCERIN 0.4 MG: 0.4 TABLET, ORALLY DISINTEGRATING SUBLINGUAL at 00:32

## 2021-07-05 RX ADMIN — Medication 10 ML: at 21:13

## 2021-07-05 RX ADMIN — HEPARIN SODIUM 7 UNITS/KG/HR: 10000 INJECTION, SOLUTION INTRAVENOUS at 09:02

## 2021-07-05 RX ADMIN — Medication 10 ML: at 14:00

## 2021-07-05 RX ADMIN — ALUMINUM HYDROXIDE, MAGNESIUM HYDROXIDE, AND SIMETHICONE 40 ML: 200; 200; 20 SUSPENSION ORAL at 00:35

## 2021-07-05 RX ADMIN — ACETAMINOPHEN 650 MG: 325 TABLET ORAL at 01:34

## 2021-07-05 NOTE — Clinical Note
Sheath: removed.   6FR X 10 CM SHEATH REMOVED AND TR BAND PLACED ON PATIENTS RIGHT WRIST AND INFLATED WITH 11 CC OF AIR

## 2021-07-05 NOTE — ED PROVIDER NOTES
31-year-old male with past medical history significant for coronary artery disease with 3 stents in place, recent prostate surgery (May 2021) with 4-day hospital stay presents with complaints of left-sided chest pain worse with laying down starting at 10 PM tonight. Associated with shortness of breath. Denies diaphoresis, nausea, vomiting. Denies trauma. Denies any recent illness, fever, chills, nausea, vomiting, abdominal pain. Denies history of DVT/PE. Denies tobacco use, drug use  Occasional alcohol use  [facility] [place]  [facility]    Records show last catheterization April 2019, LAD stent placed           Past Medical History:   Diagnosis Date    CAD (coronary artery disease)     3 stents    Elevated PSA 11/16/2020    Dr Laith Thompson biopsy neg    FH: colon cancer 11/16/2020    Other ill-defined conditions(799.89)     bph    Other ill-defined conditions(799.89)     hypercholesterolemia    Other ill-defined conditions(799.89)     accidental finger amputation       Past Surgical History:   Procedure Laterality Date    CARDIAC SURG PROCEDURE UNLIST      3 stents    HX ORTHOPAEDIC      l sholder surg    HX SHOULDER REPLACEMENT Right          Family History:   Problem Relation Age of Onset    Cancer Mother 48        colon cancer    Cancer Father         bone cancer    Cancer Sister         colon cancer       Social History     Socioeconomic History    Marital status:      Spouse name: Not on file    Number of children: Not on file    Years of education: Not on file    Highest education level: Not on file   Occupational History    Not on file   Tobacco Use    Smoking status: Former Smoker     Packs/day: 1.00     Years: 30.00     Pack years: 30.00     Quit date: 11/25/2010     Years since quitting: 10.6    Smokeless tobacco: Never Used   Substance and Sexual Activity    Alcohol use:  Yes     Alcohol/week: 4.0 standard drinks     Types: 4 Cans of beer per week     Comment: socially    Drug use: No    Sexual activity: Not on file   Other Topics Concern    Not on file   Social History Narrative    Not on file     Social Determinants of Health     Financial Resource Strain:     Difficulty of Paying Living Expenses:    Food Insecurity:     Worried About Running Out of Food in the Last Year:     [address] in the Last Year:    Transportation Needs:     Lack of Transportation (Medical):  Lack of Transportation (Non-Medical):    Physical Activity:     Days of Exercise per Week:     Minutes of Exercise per Session:    Stress:     Feeling of Stress :    Social Connections:     Frequency of Communication with Friends and Family:     Frequency of Social Gatherings with Friends and Family:     Attends [employer] Services:     Active Member of Clubs or Organizations:     Attends Club or Organization Meetings:     Marital Status:    Intimate Partner Violence:     Fear of Current or Ex-Partner:     Emotionally Abused:     Physically Abused:     Sexually Abused: ALLERGIES: Pcn [penicillins]    Review of Systems   Constitutional: Negative for chills and fever. HENT: Negative for congestion, nosebleeds and sore throat. Eyes: Negative for pain and discharge. Respiratory: Positive for shortness of breath. Negative for cough. Cardiovascular: Positive for chest pain. Negative for palpitations. Gastrointestinal: Negative for abdominal pain, constipation, nausea and vomiting. Genitourinary: Negative for decreased urine volume, dysuria, flank pain and urgency. Musculoskeletal: Negative for gait problem and myalgias. Skin: Negative for rash and wound. Neurological: Negative for seizures and syncope. Hematological: Does not bruise/bleed easily. Psychiatric/Behavioral: Negative for confusion, self-injury and suicidal ideas. There were no vitals filed for this visit. Physical Exam  Vitals and nursing note reviewed.    Constitutional: Appearance: He is well-developed. He is obese. HENT:      Head: Normocephalic and atraumatic. Eyes:      Pupils: Pupils are equal, round, and reactive to light. Cardiovascular:      Rate and Rhythm: Normal rate and regular rhythm. Heart sounds: Normal heart sounds. Pulmonary:      Effort: Pulmonary effort is normal. Tachypnea present. Breath sounds: Normal breath sounds. No wheezing. Comments: Increased work of breathing  Abdominal:      General: Bowel sounds are normal.      Palpations: Abdomen is soft. Tenderness: There is no abdominal tenderness. There is no guarding or rebound. Musculoskeletal:         General: Normal range of motion. Cervical back: Normal range of motion and neck supple. Right lower leg: No edema. Left lower leg: No edema. Skin:     General: Skin is warm and dry. Neurological:      Mental Status: He is alert and oriented to person, place, and time. Psychiatric:         Behavior: Behavior normal.          MDM  Number of Diagnoses or Management Options  Chest pain, unspecified type  Coronary artery disease involving native heart, unspecified vessel or lesion type, unspecified whether angina present  Diagnosis management comments: 52-year-old male with history of coronary artery disease, recent prostate surgery with 4-day hospital stay in May 2021 presents with complaints of left-sided chest pain associated with shortness of breath starting at 10 PM tonight. Patient is afebrile, 92% on room air, mild increased work of breathing, clear to auscultation bilaterally, slightly tachypneic with a respiratory rate of 26, speaking in complete sentences, no lower extremity asymmetry, no chest wall tenderness to palpation. Plan-EKG, cardiac monitor, CBC/CMP/cardiac enzymes/D-dimer, chest x-ray, pain control.     Labs remarkable for hgb 8.3  Other labs and cxr unremarkable       Amount and/or Complexity of Data Reviewed  Clinical lab tests: ordered and reviewed  Tests in the radiology section of CPT®: ordered and reviewed  Independent visualization of images, tracings, or specimens: yes           Procedures        ED EKG interpretation:  Rhythm: normal sinus rhythm; and regular . Rate (approx.): 89; Axis: left axis deviation; P wave: normal; QRS interval: normal ; ST/T wave: normal;; Other findings: unchanged from previous ekg 6/22/20, no acute ischemia. This EKG was interpreted by Alejandrina Paris MD,ED Provider. 1:31 AM  Discussed results with patient. Reports with recent prostate surgery he had heavy bleeding following prompting his 4 day stay in hospital. Pt reports new anemia of 8.3 2/2 to recent surgery. Denies blood in stool or any current bleeding. Agreeable with plan for admission. Reports continued cp 2/10 after nitro x 2 and gi cocktail. Perfect Serve Consult for Admission  1:32 AM    ED Room Number: SER07/07  Patient Name and age:  Jamie Anderson 72 y.o.  male  Working Diagnosis:   1. Chest pain, unspecified type    2. Coronary artery disease involving native heart, unspecified vessel or lesion type, unspecified whether angina present    3. Anemia, unspecified type        COVID-19 Suspicion:  no  Sepsis present:  no  Reassessment needed: no  Code Status:  Full Code  Readmission: no  Isolation Requirements:  no  Recommended Level of Care:  telemetry  Department:[place] [facility] - [phone]  Patient accepted and admitted by hospitalist, Dr. Eligio Mishra.     3:14 AM  No telemetry beds available at this time. Discussed with ER physician, Dr. Manny Riggs, at [facility].  Patient will be transferred to emergency department while awaiting bed.

## 2021-07-05 NOTE — H&P
[address] [facility] Adult  Hospitalist Group  History and Physical    Primary Care Provider: Teetee Joseph MD  Date of Service:  7/5/2021    Subjective:     Leigh Ann Jaquez is a 72 y.o. male with a past medical history of CAD status post 3 stents, dyslipidemia, and BPH,  who presents as a transfer from [place] ED for chest pain. Of note, his last catheterization was performed in April 2019 when he received a drug-eluting stent to the LAD for 90% stenosis. He states that the pain has been present for the past 2 days approximately. It is described as a sharp pain in the left chest, and is nonradiating. The pain improves when he gets up and walks around, and is worse when he is lying down. There are no associated symptoms, however, he has had worsening dyspnea with exertion for the past 2 to 3 months. He is followed by Dr. Clementine Joseph for cardiology, and states that he has had an echocardiogram, and stress test recently. I do not have access to these records. In the ED, he was placed on 2 L nasal cannula with minimum documented SPO2 of 92%. Labs were significant for hyperglycemia to 207, normocytic anemia with hemoglobin of 8.3, troponins negative, and proBNP 184 (patient is obese), and D-dimer was normal. Chest x-ray showed no acute cardiopulmonary process. EKG showed normal sinus rhythm, with left axis deviation. In the ED, he received 324 chewable aspirin, GI cocktail, Nitrostat, Tylenol, and 500 cc bolus. Review of Systems:    A comprehensive review of systems was negative except for that written in the History of Present Illness.      Past Medical History:   Diagnosis Date    CAD (coronary artery disease)     3 stents    Elevated PSA 11/16/2020    Dr Garcia Slim biopsy neg    FH: colon cancer 11/16/2020    Other ill-defined conditions(799.89)     bph    Other ill-defined conditions(799.89)     hypercholesterolemia    Other ill-defined conditions(799.89)     accidental finger amputation Past Surgical History:   Procedure Laterality Date    CARDIAC SURG PROCEDURE UNLIST      3 stents    HX ORTHOPAEDIC      l sholder surg    HX SHOULDER REPLACEMENT Right      Prior to Admission medications    Medication Sig Start Date End Date Taking? Authorizing Provider   pantoprazole (PROTONIX) 40 mg tablet  9/16/20   Provider, Historical   furosemide (LASIX) 20 mg tablet  11/7/20   Provider, Historical   aspirin-calcium carbonate 81 mg-300 mg calcium(777 mg) tab Take  by mouth. Rahel Plasencia MD   clopidogreL (PLAVIX) 75 mg tab Take 75 mg by mouth daily. 4/8/19   Rahel Plasencia MD   hydroCHLOROthiazide (HYDRODIURIL) 12.5 mg tablet Take 12.5 mg by mouth daily. 4/11/19   Rahel Plasencia MD   lovastatin (MEVACOR) 10 mg tablet Take 1 Tab by mouth At bedtime. Rahel Plasencia MD   amLODIPine (NORVASC) 5 mg tablet Take 1 Tab by mouth daily. 4/4/19   Alfredito Martins MD   nitroglycerin (NITROSTAT) 0.4 mg SL tablet 1 Tab by SubLINGual route as needed for Chest Pain for up to 3 doses. Up to 3 doses. Patient not taking: Reported on 11/16/2020 4/4/19   Alfredito Martins MD   ticagrelor [facility]) 90 mg tablet Take 1 Tab by mouth two (2) times a day. Indications: blood clot prevention following percutaneous coronary intervention  Patient not taking: Reported on 11/16/2020 4/4/19   Alfredito Martins MD   atorvastatin (LIPITOR) 20 mg tablet Take 1 Tab by mouth nightly. 4/4/19   Alfredito Martins MD   tamsulosin [facility]) 0.4 mg capsule Take 0.4 mg by mouth nightly. Provider, Historical   metoprolol succinate (TOPROL-XL) 25 mg XL tablet Take 50 mg by mouth nightly. Provider, Historical   finasteride (PROSCAR) 5 mg tablet Take 5 mg by mouth daily. Rahel Plasencia MD   aspirin 81 mg chewable tablet Take 81 mg by mouth daily.     Rahel Plasencia MD     Allergies   Allergen Reactions    Pcn [Penicillins] Hives     Pt reports it makes him sleepy and have hives      Family History   Problem Relation Age of Onset    Cancer Mother 48        colon cancer    Cancer Father         bone cancer    Cancer Sister         colon cancer        SOCIAL HISTORY:  Patient resides at Home. Patient ambulates independently  Smoking history: Former 40-pack-year smoker, quit 10 years ago  Alcohol history: Drinks 2-4 beers per week    Objective:       Physical Exam:   Visit Vitals  BP (!) 155/78   Pulse 88   Temp 98 °F (36.7 °C)   Resp 21   Ht 5' 9\" (1.753 m)   Wt 130.9 kg (288 lb 9.3 oz)   SpO2 97%   BMI 42.62 kg/m²     General:  Alert, cooperative, no distress, appears stated age. Head:  Normocephalic, without obvious abnormality, atraumatic. Eyes:  Conjunctivae/corneas clear. , EOMs intact. Ears:  Normal TMs and external ear canals both ears. Nose: Nares normal. Septum midline. Throat: Lips, mucosa, and tongue normal.    Neck: Supple, symmetrical, trachea midline   Back:   Symmetric, no curvature. ROM normal.   Lungs:   Clear to auscultation bilaterally. Chest wall:  No tenderness or deformity. Heart:  Regular rate and rhythm, S1, S2 normal, no murmur, click, rub or gallop. Abdomen:   Soft, non-tender. Bowel sounds normal.           Extremities: Extremities normal, atraumatic   Pulses: 2+ radial pulses   Skin: Skin color, texture, turgor normal. No rashes or lesions     ECG:  NSR     Data Review: All diagnostic labs and studies have been reviewed. Chest x-ray: No acute cardiopulmonary process, mild pulmonary edema. Assessment:     Active Problems:    Chest pain, rule out acute myocardial infarction (7/5/2021)        Plan:     #Chest Pain  -Pain is sharp, left-sided, nonradiating, worse with lying down, and improved when he gets up and walks around. It did not improve with nitroglycerin, and is not reproducible with palpation. This is atypical, sounds more pleuritic in nature. He does endorse a dry cough and worsening dyspnea on exertion.   He has a mildly elevated proBNP, and is obese.  -We will continue to trend troponin, and consult cardiology      #CAD  #Dyslipidemia  -Continue aspirin, Plavix, metoprolol, and lipitor    #BPH  -Continue Flomax, and proscar    #Normocytic anemia  -hemoglobin is 8.3 which is down from 14.5 in November 2020  -Patient states that he had a prostate procedure in May 0002 that was complicated by postprocedural hematuria requiring multiple indwelling catheterizations. After that, he was anemic, and prescribed iron tablets.   He has not been compliant with the iron tablets due to concern over constipation.  -Add on EtOH due to macrocytosis    FEN: NPO  DVT PPx: Lovenox  MPOA: Spouse  Code: Full code    FUNCTIONAL STATUS PRIOR TO HOSPITALIZATION Ambulates Independently     Signed By: Zaid Cooney MD     July 5, 2021

## 2021-07-05 NOTE — Clinical Note
Patient Class[de-identified] OBSERVATION [104]   Type of Bed: Telemetry [19]   Cardiac Monitoring Required?: Yes   Reason for Observation: ACS rule out   Admitting Diagnosis: Chest pain, rule out acute myocardial infarction [[id number]]   Admitting Physician: Brendan Robison   Attending Physician: Josephine Restrepo [[id number]]

## 2021-07-05 NOTE — CONSULTS
CARDIOLOGY CONSULT                  Subjective:    Date of  Admission:   Admission type:Emergency    Mekhi Corbett MD     This is a 72 yom with CAD s/p multiple PCI here with chest pain. He has had worsening exertinoal dyspnea for a few weeks but then started to have acute chest pain last night. It was left sided and was improved with sitting up. There were no other clear associated symptoms. ED workup initially showed no concerning findings. Pain improved with NTG and GI cocktail. Second troponin elevated with 5X increase from initial study.  Pain has     Patient Active Problem List   Diagnosis Code    Unstable angina (HCC) I20.0    Elevated PSA R97.20    FH: colon cancer Z80.0    Chest pain, rule out acute myocardial infarction R07.9        Past Medical History:   Diagnosis Date    CAD (coronary artery disease)     3 stents    Elevated PSA 11/16/2020    Dr Amelia Sanchez biopsy neg    FH: colon cancer 11/16/2020    Other ill-defined conditions(799.89)     bph    Other ill-defined conditions(799.89)     hypercholesterolemia    Other ill-defined conditions(799.89)     accidental finger amputation      Past Surgical History:   Procedure Laterality Date    HX ORTHOPAEDIC      l sholder surg    HX SHOULDER REPLACEMENT Right     [place] CARDIAC SURG PROCEDURE UNLIST      3 stents      Family History   Problem Relation Age of Onset    Cancer Mother 48        colon cancer    Cancer Father         bone cancer    Cancer Sister         colon cancer      Social History     Socioeconomic History    Marital status:      Spouse name: Not on file    Number of children: Not on file    Years of education: Not on file    Highest education level: Not on file   Occupational History    Not on file   Tobacco Use    Smoking status: Former Smoker     Packs/day: 1.00     Years: 30.00     Pack years: 30.00     Quit date: 11/25/2010     Years since quitting: 10.6    Smokeless tobacco: Never Used Substance and Sexual Activity    Alcohol use: Yes     Alcohol/week: 4.0 standard drinks     Types: 4 Cans of beer per week     Comment: socially    Drug use: No    Sexual activity: Not on file   Other Topics Concern    Not on file   Social History Narrative    Not on file     Social Determinants of Health     Financial Resource Strain:     Difficulty of Paying Living Expenses:    Food Insecurity:     Worried About Running Out of Food in the Last Year:     [address] in the Last Year:    Transportation Needs:     Lack of Transportation (Medical):      Lack of Transportation (Non-Medical):    Physical Activity:     Days of Exercise per Week:     Minutes of Exercise per Session:    Stress:     Feeling of Stress :    Social Connections:     Frequency of Communication with Friends and Family:     Frequency of Social Gatherings with Friends and Family:     Attends S[employer] Services:     Active Member of Clubs or Organizations:     Attends Club or Organization Meetings:     Marital Status:    Intimate Partner Violence:     Fear of Current or Ex-Partner:     Emotionally Abused:     Physically Abused:     Sexually Abused:         Current Facility-Administered Medications   Medication Dose Route Frequency    sodium chloride (NS) flush 5-40 mL  5-40 mL IntraVENous Q8H    sodium chloride (NS) flush 5-40 mL  5-40 mL IntraVENous PRN    acetaminophen (TYLENOL) tablet 650 mg  650 mg Oral Q6H PRN    Or    acetaminophen (TYLENOL) suppository 650 mg  650 mg Rectal Q6H PRN    nitroglycerin (NITROSTAT) tablet 0.4 mg  0.4 mg SubLINGual PRN    aspirin chewable tablet 81 mg  81 mg Oral DAILY    clopidogreL (PLAVIX) tablet 75 mg  75 mg Oral DAILY    finasteride (PROSCAR) tablet 5 mg  5 mg Oral DAILY    tamsulosin (FLOMAX) capsule 0.4 mg  0.4 mg Oral QHS    metoprolol succinate (TOPROL-XL) XL tablet 50 mg  50 mg Oral QHS    pantoprazole (PROTONIX) tablet 40 mg  40 mg Oral ACB&D    atorvastatin (LIPITOR) tablet 40 mg  40 mg Oral QHS    heparin 25,000 units in D5W 250 ml infusion  7-25 Units/kg/hr IntraVENous TITRATE    [START ON 7/6/2021] ferrous sulfate tablet 325 mg  1 Tablet Oral DAILY WITH BREAKFAST             Review of Symptoms:    Gen - no F/C/S  Eyes - no vision changes  ENT - no sore throat, rhinorrhea, otalgia  CV - no CP, no palpitations, no orthopnea, no PND, no RACHEL  Resp no cough, no SOB/DOLAN  GI - no AP, no n/v/d/c   - no dysuria, no hematuria  MSK - no abnormal joint pains  Skin - no rashes  Neuro - no HA, no numbness, no weakness, no slurred speech  Psych - no change in mood       Physical Exam    /69 (BP 1 Location: Right upper arm, BP Patient Position: At rest)   Pulse 77   Temp 98.3 °F (36.8 °C)   Resp 18   Ht 5' 9\" (1.753 m)   Wt 130.9 kg (288 lb 9.3 oz)   SpO2 96%   BMI 42.62 kg/m²      NAD  Skin warm and dry  Nl conjunctiva  Oropharynx without exudate. Neck supple  Lungs clear  Normal S1/ S2 with occasional ectopy  No Murmurs, click or Rubs  Abdomen soft and non tender  Pulses 2+ radials  Neuro:  Grossly intact  Appropriate       Cardiographics    Telemetry: normal sinus rhythm  ECG: normal sinus rhythm  Echocardiogram: pending    Assessment: This is a 72 yom with acute chest pain and NSTEMI.  Pain sounds pericarditic in nature but he has no changes in EKG and has been developing worsening anginal-type symptoms in last few weeks    Plan:    Cont with heparin drip  Cont ASA, statin, plavix  Echo ordered  Continue on monitor  Troponin leveling off, will check additional set and can stop if downtrending  [facility]PO at [facility]    Please call with questions

## 2021-07-05 NOTE — PROGRESS NOTES
Hospitalist Progress Note      Hospital summary: Mandi Gomez is a 72 y.o. male with a past medical history of CAD status post 3 stents, dyslipidemia, and BPH,  who presents as a transfer from [facility] for chest pain. Of note, his last catheterization was performed in April 2019 when he received a drug-eluting stent to the LAD for 90% stenosis. He states that the pain has been present for the past 2 days approximately. It is described as a sharp pain in the left chest, and is nonradiating. The pain improves when he gets up and walks around, and is worse when he is lying down. There are no associated symptoms, however, he has had worsening dyspnea with exertion for the past 2 to 3 months. He is followed by Dr. Piper Ta for cardiology, and states that he has had an echocardiogram, and stress test recently. I do not have access to these records.     In the ED, he was placed on 2 L nasal cannula with minimum documented SPO2 of 92%. Labs were significant for hyperglycemia to 207, normocytic anemia with hemoglobin of 8.3, troponins negative, and proBNP 184 (patient is obese), and D-dimer was normal. Chest x-ray showed no acute cardiopulmonary process. EKG showed normal sinus rhythm, with left axis deviation.     In the ED, he received 324 chewable aspirin, GI cocktail, Nitrostat, Tylenol, and 500 cc bolus. 7/5/2021      Assessment/Plan:  #Chest Pain  -Pain is sharp, left-sided, nonradiating, worse with lying down, and improved when he gets up and walks around. It did not improve with nitroglycerin, and is not reproducible with palpation.   - Elevated troponin 0.25 from <0.05, will trend   - started on heparin gtt  - echo ordered  - cardiology consult placed. discussed with cardiology Dr. Warren Whalen, if troponin trends up, possible cardiac cath today.    - will keep NPO for now     #Normocytic anemia  -hemoglobin is 8.3 which is down from 14.5 in November 2020  -Patient states that he had a prostate procedure in May 3024 that was complicated by postprocedural hematuria  - iron deficient - added po iron supplements. fobt ordered   - will monitor hb, transfuse <7      # Hx CAD s/p stents in 2019  #Dyslipidemia  -Continue aspirin, metoprolol, and lipitor     #BPH  - pt stopped taking Flomax, and proscar    Obses Body mass index is 42.62 kg/m². recommend weight loss     Code status: Full  DVT prophylaxis: Heparin gtt  Disposition: TBD. likely home when ready  ----------------------------------------------    CC: Chest pain     S: Patient is seen and examined at bedside this AM. Chest pain improved 1/10 intensity now. Explained raise in trop, on heparin gtt and possible cardiac cath if troponin trends up. Discussed with nursing     Review of Systems:  A comprehensive review of systems was negative.     O:  Visit Vitals  /71   Pulse 85   Temp 98 °F (36.7 °C)   Resp 18   Ht 5' 9\" (1.753 m)   Wt 130.9 kg (288 lb 9.3 oz)   SpO2 96%   BMI 42.62 kg/m²       PHYSICAL EXAM:  Gen: NAD, obese   HEENT: anicteric sclerae, normal conjunctiva, oropharynx clear, MM moist  Neck: supple, trachea midline, no adenopathy  Heart: RRR, no MRG, no JVD, no peripheral edema  Lungs: CTA b/l, non-labored respirations  Abd: soft, NT, ND, BS+, no organomegaly  Extr: warm  Skin: dry, no rash  Neuro: CN II-XII grossly intact, normal speech, moves all extremities  Psych: normal mood, appropriate affect    No intake or output data in the 24 hours ending 07/05/21 1157     Recent labs & imaging reviewed:  Recent Results (from the past 24 hour(s))   EKG, 12 LEAD, INITIAL    Collection Time: 07/05/21 12:16 AM   Result Value Ref Range    Ventricular Rate 89 BPM    Atrial Rate 89 BPM    P-R Interval 152 ms    QRS Duration 104 ms    Q-T Interval 386 ms    QTC Calculation (Bezet) 469 ms    Calculated P Axis 2 degrees    Calculated R Axis -40 degrees    Calculated T Axis 33 degrees    Diagnosis       Normal sinus rhythm  Left axis deviation  Incomplete right bundle branch block  Minimal voltage criteria for LVH, may be normal variant  Abnormal ECG  When compared with ECG of 22-JUN-2020 06:50,  QT has lengthened     METABOLIC PANEL, COMPREHENSIVE    Collection Time: 07/05/21 12:17 AM   Result Value Ref Range    Sodium 139 136 - 145 mmol/L    Potassium 3.5 3.5 - 5.1 mmol/L    Chloride 103 97 - 108 mmol/L    CO2 26 21 - 32 mmol/L    Anion gap 10 5 - 15 mmol/L    Glucose 207 (H) 65 - 100 mg/dL    BUN 16 6 - 20 MG/DL    Creatinine 1.05 0.70 - 1.30 MG/DL    BUN/Creatinine ratio 15 12 - 20      GFR est AA >60 >60 ml/min/1.73m2    GFR est non-AA >60 >60 ml/min/1.73m2    Calcium 8.5 8.5 - 10.1 MG/DL    Bilirubin, total 0.2 0.2 - 1.0 MG/DL    ALT (SGPT) 29 12 - 78 U/L    AST (SGOT) 20 15 - 37 U/L    Alk. phosphatase 90 45 - 117 U/L    Protein, total 6.5 6.4 - 8.2 g/dL    Albumin 3.3 (L) 3.5 - 5.0 g/dL    Globulin 3.2 2.0 - 4.0 g/dL    A-G Ratio 1.0 (L) 1.1 - 2.2     CBC WITH AUTOMATED DIFF    Collection Time: 07/05/21 12:17 AM   Result Value Ref Range    WBC 7.7 4.1 - 11.1 K/uL    RBC 2.72 (L) 4.10 - 5.70 M/uL    HGB 8.3 (L) 12.1 - 17.0 g/dL    HCT 27.1 (L) 36.6 - 50.3 %    MCV 99.6 (H) 80.0 - 99.0 FL    MCH 30.5 26.0 - 34.0 PG    MCHC 30.6 30.0 - 36.5 g/dL    RDW 13.5 11.5 - 14.5 %    PLATELET 324 113 - 932 K/uL    MPV 11.2 8.9 - 12.9 FL    NRBC 0.0 0  WBC    ABSOLUTE NRBC 0.00 0.00 - 0.01 K/uL    NEUTROPHILS 57 32 - 75 %    LYMPHOCYTES 21 12 - 49 %    MONOCYTES 12 5 - 13 %    EOSINOPHILS 9 (H) 0 - 7 %    BASOPHILS 0 0 - 1 %    IMMATURE GRANULOCYTES 1 %    ABS. NEUTROPHILS 4.4 1.8 - 8.0 K/UL    ABS. LYMPHOCYTES 1.6 0.8 - 3.5 K/UL    ABS. MONOCYTES 0.9 0.0 - 1.0 K/UL    ABS. EOSINOPHILS 0.7 (H) 0.0 - 0.4 K/UL    ABS. BASOPHILS 0.0 0.0 - 0.1 K/UL    ABS. IMM.  GRANS. 0.1 K/UL    DF MANUAL      RBC COMMENTS NORMOCYTIC, NORMOCHROMIC     TROPONIN I    Collection Time: 07/05/21 12:17 AM   Result Value Ref Range    Troponin-I, Qt. <0.05 <0.05 ng/mL NT-PRO BNP    Collection Time: 07/05/21 12:17 AM   Result Value Ref Range    NT pro- (H) 0 - 125 PG/ML   D DIMER    Collection Time: 07/05/21 12:43 AM   Result Value Ref Range    D-dimer 0.55 0.00 - 0.65 mg/L FEU   OCCULT BLOOD, STOOL    Collection Time: 07/05/21  1:31 AM   Result Value Ref Range    Occult blood, stool Negative NEG     TROPONIN I    Collection Time: 07/05/21  5:50 AM   Result Value Ref Range    Troponin-I, Qt. 0.25 (H) <0.05 ng/mL   ETHYL ALCOHOL    Collection Time: 07/05/21  5:50 AM   Result Value Ref Range    ALCOHOL(ETHYL),SERUM <10 <10 MG/DL   IRON PROFILE    Collection Time: 07/05/21  7:06 AM   Result Value Ref Range    Iron 26 (L) 35 - 150 ug/dL    TIBC 338 250 - 450 ug/dL    Iron % saturation 8 (L) 20 - 50 %   FERRITIN    Collection Time: 07/05/21  7:06 AM   Result Value Ref Range    Ferritin 13 (L) 26 - 388 NG/ML   FOLATE    Collection Time: 07/05/21  7:06 AM   Result Value Ref Range    Folate 18.2 5.0 - 21.0 ng/mL   VITAMIN B12    Collection Time: 07/05/21  7:06 AM   Result Value Ref Range    Vitamin B12 391 193 - 986 pg/mL   EKG, 12 LEAD, INITIAL    Collection Time: 07/05/21  7:24 AM   Result Value Ref Range    Ventricular Rate 76 BPM    Atrial Rate 76 BPM    P-R Interval 156 ms    QRS Duration 92 ms    Q-T Interval 400 ms    QTC Calculation (Bezet) 450 ms    Calculated P Axis 12 degrees    Calculated R Axis -27 degrees    Calculated T Axis 38 degrees    Diagnosis       Normal sinus rhythm  When compared with ECG of 05-JUL-2021 00:16,  MANUAL COMPARISON REQUIRED, DATA IS UNCONFIRMED       Recent Labs     07/05/21  0017   WBC 7.7   HGB 8.3*   HCT 27.1*        Recent Labs     07/05/21  0017      K 3.5      CO2 26   BUN 16   CREA 1.05   *   CA 8.5     Recent Labs     07/05/21  0017   ALT 29   AP 90   TBILI 0.2   TP 6.5   ALB 3.3*   GLOB 3.2     No results for input(s): INR, PTP, APTT, INREXT in the last 72 hours.    Recent Labs     07/05/21  0706   TIBC 338 PSAT 8*   FERR 13*      Lab Results   Component Value Date/Time    Folate 18.2 07/05/2021 07:06 AM      No results for input(s): PH, PCO2, PO2 in the last 72 hours.   Recent Labs     07/05/21  0550 07/05/21  0017   TROIQ 0.25* <0.05     Lab Results   Component Value Date/Time    Cholesterol, total 131 04/02/2019 10:01 PM    HDL Cholesterol 44 04/02/2019 10:01 PM    LDL, calculated 69 04/02/2019 10:01 PM    Triglyceride 90 04/02/2019 10:01 PM    CHOL/HDL Ratio 3.0 04/02/2019 10:01 PM     No results found for: GLUCPOC  No results found for: COLOR, APPRN, SPGRU, REFSG, LEMUEL, PROTU, GLUCU, KETU, BILU, UROU, FLORIN, LEUKU, GLUKE, EPSU, BACTU, WBCU, RBCU, CASTS, UCRY    Med list reviewed  Current Facility-Administered Medications   Medication Dose Route Frequency    sodium chloride (NS) flush 5-40 mL  5-40 mL IntraVENous Q8H    sodium chloride (NS) flush 5-40 mL  5-40 mL IntraVENous PRN    acetaminophen (TYLENOL) tablet 650 mg  650 mg Oral Q6H PRN    Or    acetaminophen (TYLENOL) suppository 650 mg  650 mg Rectal Q6H PRN    nitroglycerin (NITROSTAT) tablet 0.4 mg  0.4 mg SubLINGual PRN    aspirin chewable tablet 81 mg  81 mg Oral DAILY    clopidogreL (PLAVIX) tablet 75 mg  75 mg Oral DAILY    finasteride (PROSCAR) tablet 5 mg  5 mg Oral DAILY    tamsulosin (FLOMAX) capsule 0.4 mg  0.4 mg Oral QHS    metoprolol succinate (TOPROL-XL) XL tablet 50 mg  50 mg Oral QHS    pantoprazole (PROTONIX) tablet 40 mg  40 mg Oral ACB&D    atorvastatin (LIPITOR) tablet 40 mg  40 mg Oral QHS    heparin 25,000 units in D5W 250 ml infusion  7-25 Units/kg/hr IntraVENous TITRATE       Care Plan discussed with:  Patient/Family, Nurse and     Carlos Bolaños MD  Internal Medicine  Date of Service: 7/5/2021

## 2021-07-05 NOTE — ED TRIAGE NOTES
Patient arrives via [facility], and is being transferred to be admitted for chest pain and shortness of breath. The patient comes is agitated and states that he has had no sleep. The patient is on the monitor x 3 and the call bell is within reach.

## 2021-07-06 ENCOUNTER — APPOINTMENT (OUTPATIENT)
Dept: NON INVASIVE DIAGNOSTICS | Age: 71
DRG: 281 | End: 2021-07-06
Attending: INTERNAL MEDICINE
Payer: MEDICARE

## 2021-07-06 LAB
ANION GAP SERPL CALC-SCNC: 6 MMOL/L (ref 5–15)
APTT PPP: 44.1 SEC (ref 22.1–31)
APTT PPP: 69.4 SEC (ref 22.1–31)
ATRIAL RATE: 80 BPM
BUN SERPL-MCNC: 17 MG/DL (ref 6–20)
BUN/CREAT SERPL: 18 (ref 12–20)
CALCIUM SERPL-MCNC: 8.4 MG/DL (ref 8.5–10.1)
CALCULATED P AXIS, ECG09: 27 DEGREES
CALCULATED R AXIS, ECG10: -33 DEGREES
CALCULATED T AXIS, ECG11: 32 DEGREES
CHLORIDE SERPL-SCNC: 107 MMOL/L (ref 97–108)
CO2 SERPL-SCNC: 28 MMOL/L (ref 21–32)
CREAT SERPL-MCNC: 0.96 MG/DL (ref 0.7–1.3)
DIAGNOSIS, 93000: NORMAL
ECHO AO ROOT DIAM: 4.35 CM
ECHO AV PEAK GRADIENT: 8.48 MMHG
ECHO AV PEAK VELOCITY: 145.62 CM/S
ECHO LV INTERNAL DIMENSION DIASTOLIC: 6.15 CM (ref 4.2–5.9)
ECHO LV INTERNAL DIMENSION SYSTOLIC: 4.3 CM
ECHO LV IVSD: 1.01 CM (ref 0.6–1)
ECHO LV MASS 2D: 300.2 G (ref 88–224)
ECHO LV MASS INDEX 2D: 124.6 G/M2 (ref 49–115)
ECHO LV POSTERIOR WALL DIASTOLIC: 1.24 CM (ref 0.6–1)
ECHO LVOT PEAK GRADIENT: 6.11 MMHG
ECHO LVOT PEAK VELOCITY: 123.63 CM/S
ECHO MV A VELOCITY: 101.02 CM/S
ECHO MV AREA PHT: 3.5 CM2
ECHO MV E DECELERATION TIME (DT): 216.48 MS
ECHO MV E VELOCITY: 80.39 CM/S
ECHO MV E/A RATIO: 0.8
ECHO MV PRESSURE HALF TIME (PHT): 62.78 MS
ECHO PV PEAK INSTANTANEOUS GRADIENT SYSTOLIC: 5.07 MMHG
ECHO RV TAPSE: 1.99 CM (ref 1.5–2)
ECHO TV REGURGITANT MAX VELOCITY: 281.02 CM/S
ECHO TV REGURGITANT PEAK GRADIENT: 31.59 MMHG
ERYTHROCYTE [DISTWIDTH] IN BLOOD BY AUTOMATED COUNT: 13.3 % (ref 11.5–14.5)
GLUCOSE SERPL-MCNC: 117 MG/DL (ref 65–100)
HCT VFR BLD AUTO: 25.6 % (ref 36.6–50.3)
HGB BLD-MCNC: 7.9 G/DL (ref 12.1–17)
MAGNESIUM SERPL-MCNC: 2.2 MG/DL (ref 1.6–2.4)
MCH RBC QN AUTO: 30.4 PG (ref 26–34)
MCHC RBC AUTO-ENTMCNC: 30.9 G/DL (ref 30–36.5)
MCV RBC AUTO: 98.5 FL (ref 80–99)
NRBC # BLD: 0 K/UL (ref 0–0.01)
NRBC BLD-RTO: 0 PER 100 WBC
P-R INTERVAL, ECG05: 186 MS
PLATELET # BLD AUTO: 204 K/UL (ref 150–400)
PMV BLD AUTO: 12 FL (ref 8.9–12.9)
POTASSIUM SERPL-SCNC: 4 MMOL/L (ref 3.5–5.1)
Q-T INTERVAL, ECG07: 372 MS
QRS DURATION, ECG06: 88 MS
QTC CALCULATION (BEZET), ECG08: 429 MS
RBC # BLD AUTO: 2.6 M/UL (ref 4.1–5.7)
SODIUM SERPL-SCNC: 141 MMOL/L (ref 136–145)
THERAPEUTIC RANGE,PTTT: ABNORMAL SECS (ref 58–77)
THERAPEUTIC RANGE,PTTT: ABNORMAL SECS (ref 58–77)
TROPONIN I SERPL-MCNC: 0.11 NG/ML
VENTRICULAR RATE, ECG03: 80 BPM
WBC # BLD AUTO: 7.3 K/UL (ref 4.1–11.1)

## 2021-07-06 PROCEDURE — 83735 ASSAY OF MAGNESIUM: CPT

## 2021-07-06 PROCEDURE — 85027 COMPLETE CBC AUTOMATED: CPT

## 2021-07-06 PROCEDURE — 84484 ASSAY OF TROPONIN QUANT: CPT

## 2021-07-06 PROCEDURE — 74011250637 HC RX REV CODE- 250/637: Performed by: FAMILY MEDICINE

## 2021-07-06 PROCEDURE — 74011250636 HC RX REV CODE- 250/636: Performed by: INTERNAL MEDICINE

## 2021-07-06 PROCEDURE — 36415 COLL VENOUS BLD VENIPUNCTURE: CPT

## 2021-07-06 PROCEDURE — 96375 TX/PRO/DX INJ NEW DRUG ADDON: CPT

## 2021-07-06 PROCEDURE — 85730 THROMBOPLASTIN TIME PARTIAL: CPT

## 2021-07-06 PROCEDURE — 80048 BASIC METABOLIC PNL TOTAL CA: CPT

## 2021-07-06 PROCEDURE — 74011000250 HC RX REV CODE- 250: Performed by: INTERNAL MEDICINE

## 2021-07-06 PROCEDURE — 74011250637 HC RX REV CODE- 250/637: Performed by: INTERNAL MEDICINE

## 2021-07-06 PROCEDURE — 99218 HC RM OBSERVATION: CPT

## 2021-07-06 PROCEDURE — C8929 TTE W OR WO FOL WCON,DOPPLER: HCPCS

## 2021-07-06 PROCEDURE — 96366 THER/PROPH/DIAG IV INF ADDON: CPT

## 2021-07-06 PROCEDURE — 93005 ELECTROCARDIOGRAM TRACING: CPT

## 2021-07-06 RX ORDER — MORPHINE SULFATE 2 MG/ML
1 INJECTION, SOLUTION INTRAMUSCULAR; INTRAVENOUS ONCE
Status: COMPLETED | OUTPATIENT
Start: 2021-07-06 | End: 2021-07-06

## 2021-07-06 RX ORDER — HEPARIN SODIUM 5000 [USP'U]/ML
2000 INJECTION, SOLUTION INTRAVENOUS; SUBCUTANEOUS ONCE
Status: COMPLETED | OUTPATIENT
Start: 2021-07-06 | End: 2021-07-06

## 2021-07-06 RX ADMIN — NITROGLYCERIN 1 INCH: 20 OINTMENT TOPICAL at 14:39

## 2021-07-06 RX ADMIN — Medication 10 ML: at 06:58

## 2021-07-06 RX ADMIN — Medication 10 ML: at 21:25

## 2021-07-06 RX ADMIN — PERFLUTREN 1.5 ML: 6.52 INJECTION, SUSPENSION INTRAVENOUS at 14:00

## 2021-07-06 RX ADMIN — FERROUS SULFATE TAB 325 MG (65 MG ELEMENTAL FE) 325 MG: 325 (65 FE) TAB at 10:58

## 2021-07-06 RX ADMIN — MORPHINE SULFATE 1 MG: 2 INJECTION, SOLUTION INTRAMUSCULAR; INTRAVENOUS at 14:29

## 2021-07-06 RX ADMIN — ASPIRIN 81 MG: 81 TABLET, CHEWABLE ORAL at 08:33

## 2021-07-06 RX ADMIN — HEPARIN SODIUM 13 UNITS/KG/HR: 10000 INJECTION, SOLUTION INTRAVENOUS at 14:24

## 2021-07-06 RX ADMIN — METOPROLOL SUCCINATE 50 MG: 50 TABLET, EXTENDED RELEASE ORAL at 21:25

## 2021-07-06 RX ADMIN — HEPARIN SODIUM 2000 UNITS: 5000 INJECTION INTRAVENOUS; SUBCUTANEOUS at 21:24

## 2021-07-06 RX ADMIN — Medication 10 ML: at 14:00

## 2021-07-06 RX ADMIN — HEPARIN SODIUM 13 UNITS/KG/HR: 10000 INJECTION, SOLUTION INTRAVENOUS at 03:46

## 2021-07-06 RX ADMIN — ATORVASTATIN CALCIUM 40 MG: 40 TABLET, FILM COATED ORAL at 21:25

## 2021-07-06 NOTE — CARDIO/PULMONARY
Cardiac Rehab: MI education folder, with catheterization brochure, to bedside of Aisha Simeon. Visited to discuss MI diagnosis and Cardiac rehab to which Aisha Simeon  Stated\" I did not have a heart attack\". Discussed the troponin elevation and symptoms he presented with. Cardiac Cath cancelled at this time due to anemia issues. Attempted to educate using teach back method. Reviewed MI diagnosis definition and purpose of intervention. Discussed risk factors for CAD to include the following: family history, elevated BMI, hyperlipidemia, hypertension, diabetes, stress, and smoking. Aisha Simeon has just received his breakfast tray and was not happy with lack of pepper for eggs. ..offered to get some but he declined. Emphasized the value of cardiac rehab. Discussed Cardiac Rehab Program format, benefits, and encouraged enrollment to assist with risk modification and management. Will follow for plan after cath is rescheduled. [facility] contact information is on his AVS.    .   Osman Koroma RN

## 2021-07-06 NOTE — PROGRESS NOTES
Problem: Falls - Risk of  Goal: *Absence of Falls  Description: Document Toby Dennis Fall Risk and appropriate interventions in the flowsheet.   Outcome: Progressing Towards Goal  Note: Fall Risk Interventions:            Medication Interventions: Evaluate medications/consider consulting pharmacy

## 2021-07-06 NOTE — PROGRESS NOTES
DIEGO: The patient plans to discharge home with wife to transport when stable for discharge. Noted plan for LHC postponed, urology consulted. RUR:  18%    1. The patient is from home and lives with wife. 2. Hospitalist, Cardiology, Urology following. 3. The patient plans to discharge home with wife to transport. Observation notice provided in writing to patient and/or caregiver as well as verbal explanation of the policy. Patients who are in outpatient status also receive the Observation notice. Patient has received notice and or patient representative has received via secure email, fax, or certified mail based on patient representative's preference. Care Management Interventions  PCP Verified by CM: Yes  Palliative Care Criteria Met (RRAT>21 & CHF Dx)?: No  Mode of Transport at Discharge: Other (see comment)  Transition of Care Consult (CM Consult): Discharge Planning  MyChart Signup: No  Discharge Durable Medical Equipment: No  Health Maintenance Reviewed: Yes  Physical Therapy Consult: No  Occupational Therapy Consult: No  Speech Therapy Consult: No  Current Support Network: Lives with Spouse  Confirm Follow Up Transport: Family  The Plan for Transition of Care is Related to the Following Treatment Goals : The patient plans to discharge home with home with wife to transport.  Resource Information Provided?: No  Discharge Location  Discharge Placement: Home with family assistance     Reason for Admission:  Chest pain, rule out acute myocardial infarction                  RUR Score:   18%                  Plan for utilizing home health:  No indications at this time.         PCP: First and Last name:  Kwame Sierra MD, phone; [phone]     Name of Practice:    Are you a current patient: Yes/No: Yes   Approximate date of last visit: July, 2020   Can you participate in a virtual visit with your PCP: Yes                    Current Advanced Directive/Advance Care Plan: Full Code      Healthcare Decision Maker:   Click here to complete [address] including selection of the Healthcare Decision Maker Relationship (ie \"Primary\")                             Transition of Care Plan:     CM met with patient in room 201-1. The patient is alert and oriented x4. Confirmed demographics. The patient is independent with ADL's/IADL's, drives a vehicle, does not own or use any DME and uses [facility] delivery and [facility] pharmacy on [address]. The patient still teaches Math and Statistics part time at [employer]. The patient plans to discharge home with his wife, Brannon Edward ([phone]) to transport. CM following for discharge needs.     Dejah Westfall RN/CRM

## 2021-07-06 NOTE — PROGRESS NOTES
Problem: Falls - Risk of  Goal: *Absence of Falls  Description: Document Star Leblanc Fall Risk and appropriate interventions in the flowsheet.   7/6/2021 1656 by Crista Hadley RN  Outcome: Progressing Towards Goal  Note: Fall Risk Interventions:            Medication Interventions: Teach patient to arise slowly                7/6/2021 1656 by Crista Hadley RN  Note: Fall Risk Interventions:            Medication Interventions: Teach patient to arise slowly

## 2021-07-06 NOTE — PROGRESS NOTES
Hospitalist Progress Note      Hospital summary: Dena Gage is a 72 y.o. male with a past medical history of CAD status post 3 stents, dyslipidemia, and BPH,  who presents as a transfer from [place] [facility] for chest pain. Of note, his last catheterization was performed in April 2019 when he received a drug-eluting stent to the LAD for 90% stenosis. He states that the pain has been present for the past 2 days approximately. It is described as a sharp pain in the left chest, and is nonradiating. The pain improves when he gets up and walks around, and is worse when he is lying down. There are no associated symptoms, however, he has had worsening dyspnea with exertion for the past 2 to 3 months. He is followed by Dr. Srinivas Horan for cardiology, and states that he has had an echocardiogram, and stress test recently. I do not have access to these records.     In the ED, he was placed on 2 L nasal cannula with minimum documented SPO2 of 92%. Labs were significant for hyperglycemia to 207, normocytic anemia with hemoglobin of 8.3, troponins negative, and proBNP 184 (patient is obese), and D-dimer was normal. Chest x-ray showed no acute cardiopulmonary process. EKG showed normal sinus rhythm, with left axis deviation.     In the ED, he received 324 chewable aspirin, GI cocktail, Nitrostat, Tylenol, and 500 cc bolus. 7/5/2021      Assessment/Plan:  # Acute Chest Pain - resolved   -Pain is sharp, left-sided, nonradiating, worse with lying down, and improved when he gets up and walks around.   It did not improve with nitroglycerin, and is not reproducible with palpation.   - Elevated troponin 0.25 from <0.05  - on heparin gtt  - echo ordered  - appreciate cardiology input   - C postponed due to low hb    #Normocytic anemia  -hemoglobin is 8.3 on poa which is down from 14.5 in November 2020  -Patient states that he had a prostate procedure in May 5832 that was complicated by postprocedural hematuria  - iron deficient - added po iron supplements. fobt negative   - hb low, will monitor hb, transfuse <7  - urology consult placed       # Hx CAD s/p stents in 2019  #Dyslipidemia  -Continue aspirin, metoprolol, and lipitor     #BPH  - pt stopped taking Flomax, and proscar    Obses Body mass index is 42.93 kg/m². recommend weight loss     Code status: Full  DVT prophylaxis: Heparin gtt  Disposition: TBD. likely home when ready  ----------------------------------------------    CC: Chest pain     S: Patient is seen and examined at bedside this AM. Chest pain resolved. Plan for C today. Discussed with nursing     Review of Systems:  A comprehensive review of systems was negative.     O:  Visit Vitals  /72 (BP Patient Position: At rest;Sitting)   Pulse 71   Temp 98.2 °F (36.8 °C)   Resp 20   Ht 5' 9\" (1.753 m)   Wt 131.9 kg (290 lb 11.2 oz)   SpO2 93%   BMI 42.93 kg/m²       PHYSICAL EXAM:  Gen: NAD, obese   HEENT: anicteric sclerae, normal conjunctiva, oropharynx clear, MM moist  Neck: supple, trachea midline, no adenopathy  Heart: RRR, no MRG, no JVD, no peripheral edema  Lungs: CTA b/l, non-labored respirations  Abd: soft, NT, ND, BS+, no organomegaly  Extr: warm  Skin: dry, no rash  Neuro: CN II-XII grossly intact, normal speech, moves all extremities  Psych: normal mood, appropriate affect      Intake/Output Summary (Last 24 hours) at 7/6/2021 1013  Last data filed at 7/5/2021 1844  Gross per 24 hour   Intake 480 ml   Output --   Net 480 ml        Recent labs & imaging reviewed:  Recent Results (from the past 24 hour(s))   PTT    Collection Time: 07/05/21 11:35 AM   Result Value Ref Range    aPTT 46.4 (H) 22.1 - 31.0 sec    aPTT, therapeutic range     58.0 - 77.0 SECS   TROPONIN I    Collection Time: 07/05/21 11:35 AM   Result Value Ref Range    Troponin-I, Qt. 0.31 (H) <0.05 ng/mL   PTT    Collection Time: 07/05/21  6:09 PM   Result Value Ref Range    aPTT 38.3 (H) 22.1 - 31.0 sec    aPTT, therapeutic range     58.0 - 77.0 SECS   TROPONIN I    Collection Time: 07/05/21  6:09 PM   Result Value Ref Range    Troponin-I, Qt. 0.22 (H) <8.68 ng/mL   METABOLIC PANEL, BASIC    Collection Time: 07/06/21 12:38 AM   Result Value Ref Range    Sodium 141 136 - 145 mmol/L    Potassium 4.0 3.5 - 5.1 mmol/L    Chloride 107 97 - 108 mmol/L    CO2 28 21 - 32 mmol/L    Anion gap 6 5 - 15 mmol/L    Glucose 117 (H) 65 - 100 mg/dL    BUN 17 6 - 20 MG/DL    Creatinine 0.96 0.70 - 1.30 MG/DL    BUN/Creatinine ratio 18 12 - 20      GFR est AA >60 >60 ml/min/1.73m2    GFR est non-AA >60 >60 ml/min/1.73m2    Calcium 8.4 (L) 8.5 - 10.1 MG/DL   MAGNESIUM    Collection Time: 07/06/21 12:38 AM   Result Value Ref Range    Magnesium 2.2 1.6 - 2.4 mg/dL   CBC W/O DIFF    Collection Time: 07/06/21 12:38 AM   Result Value Ref Range    WBC 7.3 4.1 - 11.1 K/uL    RBC 2.60 (L) 4.10 - 5.70 M/uL    HGB 7.9 (L) 12.1 - 17.0 g/dL    HCT 25.6 (L) 36.6 - 50.3 %    MCV 98.5 80.0 - 99.0 FL    MCH 30.4 26.0 - 34.0 PG    MCHC 30.9 30.0 - 36.5 g/dL    RDW 13.3 11.5 - 14.5 %    PLATELET 965 986 - 282 K/uL    MPV 12.0 8.9 - 12.9 FL    NRBC 0.0 0  WBC    ABSOLUTE NRBC 0.00 0.00 - 0.01 K/uL   PTT    Collection Time: 07/06/21 12:38 AM   Result Value Ref Range    aPTT 69.4 (H) 22.1 - 31.0 sec    aPTT, therapeutic range     58.0 - 77.0 SECS     Recent Labs     07/06/21  0038 07/05/21  0017   WBC 7.3 7.7   HGB 7.9* 8.3*   HCT 25.6* 27.1*    222     Recent Labs     07/06/21  0038 07/05/21  0017    139   K 4.0 3.5    103   CO2 28 26   BUN 17 16   CREA 0.96 1.05   * 207*   CA 8.4* 8.5   MG 2.2  --      Recent Labs     07/05/21  0017   ALT 29   AP 90   TBILI 0.2   TP 6.5   ALB 3.3*   GLOB 3.2     Recent Labs     07/06/21  0038 07/05/21  1809 07/05/21  1135   APTT 69.4* 38.3* 46.4*      Recent Labs     07/05/21  0706   TIBC 338   PSAT 8*   FERR 13*      Lab Results   Component Value Date/Time    Folate 18.2 07/05/2021 07:06 AM No results for input(s): PH, PCO2, PO2 in the last 72 hours.   Recent Labs     07/05/21  1809 07/05/21  1135 07/05/21  0550   TROIQ 0.22* 0.31* 0.25*     Lab Results   Component Value Date/Time    Cholesterol, total 131 04/02/2019 10:01 PM    HDL Cholesterol 44 04/02/2019 10:01 PM    LDL, calculated 69 04/02/2019 10:01 PM    Triglyceride 90 04/02/2019 10:01 PM    CHOL/HDL Ratio 3.0 04/02/2019 10:01 PM     No results found for: GLUCPOC  No results found for: COLOR, APPRN, SPGRU, REFSG, LEMUEL, PROTU, GLUCU, KETU, BILU, UROU, FLORIN, LEUKU, GLUKE, EPSU, BACTU, WBCU, RBCU, CASTS, UCRY    Med list reviewed  Current Facility-Administered Medications   Medication Dose Route Frequency    sodium chloride (NS) flush 5-40 mL  5-40 mL IntraVENous Q8H    sodium chloride (NS) flush 5-40 mL  5-40 mL IntraVENous PRN    acetaminophen (TYLENOL) tablet 650 mg  650 mg Oral Q6H PRN    Or    acetaminophen (TYLENOL) suppository 650 mg  650 mg Rectal Q6H PRN    nitroglycerin (NITROSTAT) tablet 0.4 mg  0.4 mg SubLINGual PRN    aspirin chewable tablet 81 mg  81 mg Oral DAILY    metoprolol succinate (TOPROL-XL) XL tablet 50 mg  50 mg Oral QHS    atorvastatin (LIPITOR) tablet 40 mg  40 mg Oral QHS    heparin 25,000 units in D5W 250 ml infusion  7-25 Units/kg/hr IntraVENous TITRATE    ferrous sulfate tablet 325 mg  1 Tablet Oral DAILY WITH BREAKFAST       Care Plan discussed with:  Patient/Family, Nurse and     Tera Yang MD  Internal Medicine  Date of Service: 7/6/2021

## 2021-07-06 NOTE — PROGRESS NOTES
Pt approached the nurses station and asked for \"4 baby aspirin. \" I asked the pt if he was having chest pain, and he said \"yeah, give me 4 baby aspirin. \" asked the pt the severity and quality of the pain. He described the pain as 3/10. I explained to him I could not give him 4 aspirin, as I had no order to do so. Paged attending, pt spoke with attending on the phone.  Restarted pt heparin drip and given morphine per MD.

## 2021-07-06 NOTE — PROGRESS NOTES
Bedside and Verbal shift change report given to Kathryn Macias RN (oncoming nurse) by Jt Leslie RN (offgoing nurse). Report included the following information SBAR, Kardex and MAR.

## 2021-07-06 NOTE — CONSULTS
Urology Consult    Patient: Mandi Gomez MRN: [id number]  SSN: [id number]    YOB: 1950  Age: 72 y.o. Sex: male          Date of Consultation:  July 6, 2021  Requesting Physician: Luigi Toro MD  Reason for Consultation:    Pre-existing [facility] Patient:   Physician:               History of Present Illness:  Patient is a 72 y.o. male admitted 7/5/2021 to the hospital for Chest pain, rule out acute myocardial infarction [R07.9]. He is about 2 mos s/p robotic simple prostatectomy by Dr Laurie De La Cruz. He had delayed bleed requiring cysto and clot evacuation 6/4/21. Since then he has been voiding well and urine is completely clear. Hgb 8.2 at time of discharge last month. He has not been taking his iron due to constipation. Past Medical History: Allergies   Allergen Reactions    Pcn [Penicillins] Hives     Pt reports it makes him sleepy and have hives      Prior to Admission medications    Medication Sig Start Date End Date Taking? Authorizing Provider   atorvastatin (Lipitor) 40 mg tablet Take 40 mg by mouth nightly. Yes Provider, Historical   furosemide (LASIX) 20 mg tablet  11/7/20   Provider, Historical   amLODIPine (NORVASC) 5 mg tablet Take 1 Tab by mouth daily. 4/4/19   Jazmine Martinez MD   metoprolol succinate (TOPROL-XL) 25 mg XL tablet Take 50 mg by mouth nightly. Provider, Historical   aspirin 81 mg chewable tablet Take 81 mg by mouth daily. Other, MD Rahel      PMHx:  has a past medical history of CAD (coronary artery disease), Elevated PSA (11/16/2020), FH: colon cancer (11/16/2020), Other ill-defined conditions(799.89), Other ill-defined conditions(799.89), and Other ill-defined conditions(799.89). PSurgHx:  has a past surgical history that includes hx orthopaedic; pr cardiac surg procedure unlist; and hx shoulder replacement (Right). PSocHx:  reports that he quit smoking about 10 years ago. He has a 30.00 pack-year smoking history.  He has never used smokeless tobacco. He reports current alcohol use of about 4.0 standard drinks of alcohol per week. He reports that he does not use drugs. ROS:  Admission ROS by Juan Tony MD from 2021 were reviewed with the patient and changes (other than per HPI) include: none. All 12 systems were reviewed and were otherwise negative. Physical Exam:            Vitals[de-identified]    Temp (24hrs), Av.1 °F (36.7 °C), Min:97.8 °F (36.6 °C), Max:98.3 °F (36.8 °C)   Blood pressure 114/72, pulse 71, temperature 98.2 °F (36.8 °C), resp. rate 20, height 5' 9\" (1.753 m), weight 131.9 kg (290 lb 11.2 oz), SpO2 93 %. Lab Results   Component Value Date/Time    WBC 7.3 2021 12:38 AM    HCT 25.6 (L) 2021 12:38 AM    PLATELET 449  12:38 AM    Sodium 141 2021 12:38 AM    Potassium 4.0 2021 12:38 AM    Chloride 107 2021 12:38 AM    CO2 28 2021 12:38 AM    BUN 17 2021 12:38 AM    Creatinine 0.96 2021 12:38 AM    Glucose 117 (H) 2021 12:38 AM    Calcium 8.4 (L) 2021 12:38 AM    Magnesium 2.2 2021 12:38 AM    INR 1.0 2020 07:01 AM       UA: No results found for: COLOR, APPRN, SPGRU, REFSG, LEMUEL, PROTU, GLUCU, KETU, BILU, UROU, FLORIN, LEUKU, GLUKE, EPSU, BACTU, WBCU, RBCU, CASTS, UCRY    Cultures:   Xrays:     Assessment/Plan:   Delayed bleed p simple prostatectomy but voiding clear urine for the past month. No  intervention needed. Outpatient f/u with Dr Colette Swanson. Call if any questions/concerns.      Signed By: Tres Taylor MD  - 2021

## 2021-07-06 NOTE — PROGRESS NOTES
Cardiology Progress Note  2021     Admit Date: 2021  Admit Diagnosis: Chest pain, rule out acute myocardial infarction [R07.9]  CC: none currently    Assessment:   Active Problems:    Chest pain, rule out acute myocardial infarction (2021)      Plan:     Cancel [facility] for today, request urology consult given recent  bleeding, profound anemia, will need DAPT and the severity/recency of the bleed may require BMS  Cont other cardiac meds  No more troponin checks  Echo    Subjective:      Rc Butler has had no more pain    Objective:    Physical Exam:  Overall VSSAF;    Visit Vitals  /72 (BP Patient Position: At rest;Sitting)   Pulse 71   Temp 98.2 °F (36.8 °C)   Resp 20   Ht 5' 9\" (1.753 m)   Wt 131.9 kg (290 lb 11.2 oz)   SpO2 93%   BMI 42.93 kg/m²     Temp (24hrs), Av.1 °F (36.7 °C), Min:97.8 °F (36.6 °C), Max:98.3 °F (36.8 °C)    Patient Vitals for the past 8 hrs:   Pulse   21 0753 71   21 0404 82    Patient Vitals for the past 8 hrs:   Resp   21 0753 20   21 0404 18    Patient Vitals for the past 8 hrs:   BP   21 0753 114/72   21 0404 (!) 172/78      07/ 1901 -  0700  In: 480 [P.O.:480]  Out: -       General Appearance: Well developed, well nourished, no acute distress. Ears/Nose/Mouth/Throat:   Normal MM; anicteric. JVP: WNL   Resp:   Lungs clear to auscultation bilaterally. Nl resp effort. Cardiovascular:  RRR, S1, S2 normal, no new murmur. No gallop or rub. Abdomen:   Soft, non-tender, bowel sounds are present. Extremities: No edema bilaterally. Skin:  Neuro: Warm and dry.   A/O x3, grossly nonfocal                         Data Review:     Telemetry independently reviewed :   normal sinus rhythm       ECG independently reviewed: NSR  Labs:   Recent Results (from the past 24 hour(s))   PTT    Collection Time: 21 11:35 AM   Result Value Ref Range    aPTT 46.4 (H) 22.1 - 31.0 sec    aPTT, therapeutic range     58.0 - 77.0 SECS   TROPONIN I    Collection Time: 07/05/21 11:35 AM   Result Value Ref Range    Troponin-I, Qt. 0.31 (H) <0.05 ng/mL   PTT    Collection Time: 07/05/21  6:09 PM   Result Value Ref Range    aPTT 38.3 (H) 22.1 - 31.0 sec    aPTT, therapeutic range     58.0 - 77.0 SECS   TROPONIN I    Collection Time: 07/05/21  6:09 PM   Result Value Ref Range    Troponin-I, Qt. 0.22 (H) <4.68 ng/mL   METABOLIC PANEL, BASIC    Collection Time: 07/06/21 12:38 AM   Result Value Ref Range    Sodium 141 136 - 145 mmol/L    Potassium 4.0 3.5 - 5.1 mmol/L    Chloride 107 97 - 108 mmol/L    CO2 28 21 - 32 mmol/L    Anion gap 6 5 - 15 mmol/L    Glucose 117 (H) 65 - 100 mg/dL    BUN 17 6 - 20 MG/DL    Creatinine 0.96 0.70 - 1.30 MG/DL    BUN/Creatinine ratio 18 12 - 20      GFR est AA >60 >60 ml/min/1.73m2    GFR est non-AA >60 >60 ml/min/1.73m2    Calcium 8.4 (L) 8.5 - 10.1 MG/DL   MAGNESIUM    Collection Time: 07/06/21 12:38 AM   Result Value Ref Range    Magnesium 2.2 1.6 - 2.4 mg/dL   CBC W/O DIFF    Collection Time: 07/06/21 12:38 AM   Result Value Ref Range    WBC 7.3 4.1 - 11.1 K/uL    RBC 2.60 (L) 4.10 - 5.70 M/uL    HGB 7.9 (L) 12.1 - 17.0 g/dL    HCT 25.6 (L) 36.6 - 50.3 %    MCV 98.5 80.0 - 99.0 FL    MCH 30.4 26.0 - 34.0 PG    MCHC 30.9 30.0 - 36.5 g/dL    RDW 13.3 11.5 - 14.5 %    PLATELET 570 241 - 057 K/uL    MPV 12.0 8.9 - 12.9 FL    NRBC 0.0 0  WBC    ABSOLUTE NRBC 0.00 0.00 - 0.01 K/uL   PTT    Collection Time: 07/06/21 12:38 AM   Result Value Ref Range    aPTT 69.4 (H) 22.1 - 31.0 sec    aPTT, therapeutic range     58.0 - 77.0 SECS      Current medications reviewed       Lynnette Gonzalez MD

## 2021-07-06 NOTE — PROGRESS NOTES
Patient noted that when going to restroom that his urine was pink tinged. Stated that there were also \"scabs\" coming out as well. Requested that patient let me see urine before flushing next time. He is concerned because of past surgery with his prostate. Carl Cunningham NP Hospitalist regarding issue. Patient is on Heparin drip pending cardiac cath in AM.  Noted that Hgb has dropped slightly to 7.9 from 8.3. Per Jethro value of keeping patient on Heparin prior to cath would outweigh pink tinged urine. Will continue to monitor.

## 2021-07-07 VITALS
DIASTOLIC BLOOD PRESSURE: 76 MMHG | HEART RATE: 95 BPM | HEIGHT: 69 IN | OXYGEN SATURATION: 93 % | RESPIRATION RATE: 23 BRPM | BODY MASS INDEX: 42.78 KG/M2 | WEIGHT: 288.8 LBS | SYSTOLIC BLOOD PRESSURE: 146 MMHG | TEMPERATURE: 98.2 F

## 2021-07-07 PROBLEM — R07.9 CHEST PAIN: Status: ACTIVE | Noted: 2021-07-07

## 2021-07-07 LAB
ANION GAP SERPL CALC-SCNC: 4 MMOL/L (ref 5–15)
APTT PPP: 73.6 SEC (ref 22.1–31)
APTT PPP: 84.9 SEC (ref 22.1–31)
BUN SERPL-MCNC: 12 MG/DL (ref 6–20)
BUN/CREAT SERPL: 13 (ref 12–20)
CALCIUM SERPL-MCNC: 8.8 MG/DL (ref 8.5–10.1)
CHLORIDE SERPL-SCNC: 108 MMOL/L (ref 97–108)
CO2 SERPL-SCNC: 27 MMOL/L (ref 21–32)
CREAT SERPL-MCNC: 0.93 MG/DL (ref 0.7–1.3)
ERYTHROCYTE [DISTWIDTH] IN BLOOD BY AUTOMATED COUNT: 13.2 % (ref 11.5–14.5)
GLUCOSE SERPL-MCNC: 127 MG/DL (ref 65–100)
HCT VFR BLD AUTO: 26.7 % (ref 36.6–50.3)
HGB BLD-MCNC: 8.1 G/DL (ref 12.1–17)
MAGNESIUM SERPL-MCNC: 2.3 MG/DL (ref 1.6–2.4)
MCH RBC QN AUTO: 30.5 PG (ref 26–34)
MCHC RBC AUTO-ENTMCNC: 30.3 G/DL (ref 30–36.5)
MCV RBC AUTO: 100.4 FL (ref 80–99)
NRBC # BLD: 0 K/UL (ref 0–0.01)
NRBC BLD-RTO: 0 PER 100 WBC
PLATELET # BLD AUTO: 201 K/UL (ref 150–400)
PMV BLD AUTO: 11.9 FL (ref 8.9–12.9)
POTASSIUM SERPL-SCNC: 4.1 MMOL/L (ref 3.5–5.1)
RBC # BLD AUTO: 2.66 M/UL (ref 4.1–5.7)
SODIUM SERPL-SCNC: 139 MMOL/L (ref 136–145)
THERAPEUTIC RANGE,PTTT: ABNORMAL SECS (ref 58–77)
THERAPEUTIC RANGE,PTTT: ABNORMAL SECS (ref 58–77)
WBC # BLD AUTO: 7.6 K/UL (ref 4.1–11.1)

## 2021-07-07 PROCEDURE — 93458 L HRT ARTERY/VENTRICLE ANGIO: CPT | Performed by: INTERNAL MEDICINE

## 2021-07-07 PROCEDURE — 77030019569 HC BND COMPR RAD TERU -B: Performed by: INTERNAL MEDICINE

## 2021-07-07 PROCEDURE — 85730 THROMBOPLASTIN TIME PARTIAL: CPT

## 2021-07-07 PROCEDURE — B2111ZZ FLUOROSCOPY OF MULTIPLE CORONARY ARTERIES USING LOW OSMOLAR CONTRAST: ICD-10-PCS | Performed by: INTERNAL MEDICINE

## 2021-07-07 PROCEDURE — 96366 THER/PROPH/DIAG IV INF ADDON: CPT

## 2021-07-07 PROCEDURE — 4A023N7 MEASUREMENT OF CARDIAC SAMPLING AND PRESSURE, LEFT HEART, PERCUTANEOUS APPROACH: ICD-10-PCS | Performed by: INTERNAL MEDICINE

## 2021-07-07 PROCEDURE — 80048 BASIC METABOLIC PNL TOTAL CA: CPT

## 2021-07-07 PROCEDURE — 74011000636 HC RX REV CODE- 636: Performed by: INTERNAL MEDICINE

## 2021-07-07 PROCEDURE — 74011000250 HC RX REV CODE- 250: Performed by: INTERNAL MEDICINE

## 2021-07-07 PROCEDURE — C1769 GUIDE WIRE: HCPCS | Performed by: INTERNAL MEDICINE

## 2021-07-07 PROCEDURE — 99152 MOD SED SAME PHYS/QHP 5/>YRS: CPT | Performed by: INTERNAL MEDICINE

## 2021-07-07 PROCEDURE — 74011250636 HC RX REV CODE- 250/636: Performed by: INTERNAL MEDICINE

## 2021-07-07 PROCEDURE — 65660000000 HC RM CCU STEPDOWN

## 2021-07-07 PROCEDURE — 77030013744: Performed by: INTERNAL MEDICINE

## 2021-07-07 PROCEDURE — 83735 ASSAY OF MAGNESIUM: CPT

## 2021-07-07 PROCEDURE — 99153 MOD SED SAME PHYS/QHP EA: CPT | Performed by: INTERNAL MEDICINE

## 2021-07-07 PROCEDURE — C1894 INTRO/SHEATH, NON-LASER: HCPCS | Performed by: INTERNAL MEDICINE

## 2021-07-07 PROCEDURE — 36415 COLL VENOUS BLD VENIPUNCTURE: CPT

## 2021-07-07 PROCEDURE — 74011250637 HC RX REV CODE- 250/637: Performed by: FAMILY MEDICINE

## 2021-07-07 PROCEDURE — 99218 HC RM OBSERVATION: CPT

## 2021-07-07 PROCEDURE — 85027 COMPLETE CBC AUTOMATED: CPT

## 2021-07-07 PROCEDURE — 74011250637 HC RX REV CODE- 250/637: Performed by: INTERNAL MEDICINE

## 2021-07-07 PROCEDURE — 77030004532 HC CATH ANGI DX IMP BSC -A: Performed by: INTERNAL MEDICINE

## 2021-07-07 PROCEDURE — 77030010221 HC SPLNT WR POS TELE -B: Performed by: INTERNAL MEDICINE

## 2021-07-07 RX ORDER — HEPARIN SODIUM 1000 [USP'U]/ML
1000 INJECTION, SOLUTION INTRAVENOUS; SUBCUTANEOUS ONCE
Status: COMPLETED | OUTPATIENT
Start: 2021-07-07 | End: 2021-07-07

## 2021-07-07 RX ORDER — NITROGLYCERIN 0.4 MG/1
0.4 TABLET SUBLINGUAL AS NEEDED
Qty: 1 BOTTLE | Refills: 0 | Status: SHIPPED | OUTPATIENT
Start: 2021-07-07 | End: 2022-09-23 | Stop reason: ALTCHOICE

## 2021-07-07 RX ORDER — CLOPIDOGREL BISULFATE 75 MG/1
75 TABLET ORAL DAILY
Status: DISCONTINUED | OUTPATIENT
Start: 2021-07-08 | End: 2021-07-07 | Stop reason: HOSPADM

## 2021-07-07 RX ORDER — FUROSEMIDE 40 MG/1
40 TABLET ORAL DAILY
Status: DISCONTINUED | OUTPATIENT
Start: 2021-07-08 | End: 2021-07-07 | Stop reason: HOSPADM

## 2021-07-07 RX ORDER — HEPARIN SODIUM 1000 [USP'U]/ML
INJECTION, SOLUTION INTRAVENOUS; SUBCUTANEOUS AS NEEDED
Status: DISCONTINUED | OUTPATIENT
Start: 2021-07-07 | End: 2021-07-07 | Stop reason: HOSPADM

## 2021-07-07 RX ORDER — VERAPAMIL HYDROCHLORIDE 2.5 MG/ML
INJECTION, SOLUTION INTRAVENOUS AS NEEDED
Status: DISCONTINUED | OUTPATIENT
Start: 2021-07-07 | End: 2021-07-07 | Stop reason: HOSPADM

## 2021-07-07 RX ORDER — MIDAZOLAM HYDROCHLORIDE 1 MG/ML
INJECTION, SOLUTION INTRAMUSCULAR; INTRAVENOUS AS NEEDED
Status: DISCONTINUED | OUTPATIENT
Start: 2021-07-07 | End: 2021-07-07 | Stop reason: HOSPADM

## 2021-07-07 RX ORDER — LANOLIN ALCOHOL/MO/W.PET/CERES
325 CREAM (GRAM) TOPICAL
Qty: 30 TABLET | Refills: 0 | Status: SHIPPED | OUTPATIENT
Start: 2021-07-08 | End: 2022-09-23 | Stop reason: ALTCHOICE

## 2021-07-07 RX ORDER — SODIUM CHLORIDE 9 MG/ML
50 INJECTION, SOLUTION INTRAVENOUS CONTINUOUS
Status: DISCONTINUED | OUTPATIENT
Start: 2021-07-07 | End: 2021-07-07 | Stop reason: HOSPADM

## 2021-07-07 RX ORDER — FENTANYL CITRATE 50 UG/ML
INJECTION, SOLUTION INTRAMUSCULAR; INTRAVENOUS AS NEEDED
Status: DISCONTINUED | OUTPATIENT
Start: 2021-07-07 | End: 2021-07-07 | Stop reason: HOSPADM

## 2021-07-07 RX ORDER — CLOPIDOGREL BISULFATE 75 MG/1
75 TABLET ORAL DAILY
Qty: 30 TABLET | Refills: 0 | Status: SHIPPED | OUTPATIENT
Start: 2021-07-08 | End: 2022-09-23 | Stop reason: ALTCHOICE

## 2021-07-07 RX ORDER — LIDOCAINE HYDROCHLORIDE 10 MG/ML
INJECTION INFILTRATION; PERINEURAL AS NEEDED
Status: DISCONTINUED | OUTPATIENT
Start: 2021-07-07 | End: 2021-07-07 | Stop reason: HOSPADM

## 2021-07-07 RX ORDER — HEPARIN SODIUM 200 [USP'U]/100ML
INJECTION, SOLUTION INTRAVENOUS
Status: COMPLETED | OUTPATIENT
Start: 2021-07-07 | End: 2021-07-07

## 2021-07-07 RX ORDER — FUROSEMIDE 40 MG/1
40 TABLET ORAL DAILY
Qty: 30 TABLET | Refills: 0 | Status: SHIPPED | OUTPATIENT
Start: 2021-07-07 | End: 2022-09-23 | Stop reason: ALTCHOICE

## 2021-07-07 RX ADMIN — HEPARIN SODIUM 1000 UNITS: 1000 INJECTION INTRAVENOUS; SUBCUTANEOUS at 15:46

## 2021-07-07 RX ADMIN — HEPARIN SODIUM 13 UNITS/KG/HR: 10000 INJECTION, SOLUTION INTRAVENOUS at 11:38

## 2021-07-07 RX ADMIN — Medication 10 ML: at 05:30

## 2021-07-07 RX ADMIN — ACETAMINOPHEN 650 MG: 325 TABLET ORAL at 08:42

## 2021-07-07 RX ADMIN — FERROUS SULFATE TAB 325 MG (65 MG ELEMENTAL FE) 325 MG: 325 (65 FE) TAB at 07:15

## 2021-07-07 RX ADMIN — HEPARIN SODIUM 15 UNITS/KG/HR: 10000 INJECTION, SOLUTION INTRAVENOUS at 04:31

## 2021-07-07 RX ADMIN — NITROGLYCERIN 1 INCH: 20 OINTMENT TOPICAL at 08:44

## 2021-07-07 RX ADMIN — SODIUM CHLORIDE 50 ML/HR: 900 INJECTION, SOLUTION INTRAVENOUS at 14:06

## 2021-07-07 NOTE — DISCHARGE INSTRUCTIONS
Please bring this form with you to show your primary care provider at your follow-up appointment. Primary care provider:  Dr. Diomedes Peguero MD    Discharging provider:  Dann Woodall MD    You have been admitted to the hospital with the following diagnoses:  · Chest pain, rule out acute myocardial infarction [R07.9]  · Chest pain [R07.9]    FOLLOW-UP CARE RECOMMENDATIONS:    APPOINTMENTS:  · Follow-up with primary care provider, Dr. Diomedes Peguero MD  -  Please call [phone] shortly after discharge to set up an appointment to be seen in  1 week    · Cardiology on 1 -2 weeks     FOLLOW-UP TESTS recommended: cbc, bmp in 1 week     PENDING TEST RESULTS:  At the time of your discharge the following test results are still pending: none   Please make sure you review these results with your outpatient follow-up provider(s). SYMPTOMS to watch for: chest pain, shortness of breath, fever, chills, nausea, vomiting, diarrhea, change in mentation, falling, weakness, bleeding. DIET/what to eat:  Cardiac Diet    ACTIVITY:  Activity as tolerated    What to do if new or unexpected symptoms occur? If you experience any of the above symptoms (or should other concerns or questions arise after discharge) please call your primary care physician. Return to the emergency room if you cannot get hold of your doctor. · It is very important that you keep your follow-up appointment(s). · Please bring discharge papers, medication list (and/or medication bottles) to your follow-up appointments for review by your outpatient provider(s). · Please check the list of medications and be sure it includes every medication (even non-prescription medications) that your provider wants you to take. · It is important that you take the medication exactly as they are prescribed.    · Keep your medication in the bottles provided by the pharmacist and keep a list of the medication names, dosages, and times to be taken in your wallet. · Do not take other medications without consulting your doctor. · If you have any questions about your medications or other instructions, please talk to your nurse or care provider before you leave the hospital.    I understand that if any problems occur once I am at home I am to contact my physician. These instructions were explained to me and I had the opportunity to ask questions.         Coronary Angiogram: What to Expect at [address] [place] [address]    A coronary angiogram is a test to examine the large blood vessel of your heart (coronary artery). The doctor inserted a thin, flexible tube (catheter) into a blood vessel in your groin. In some cases, the catheter is placed in a blood vessel in the arm. Your groin or arm may have a bruise and feel sore for a day or two after the procedure. You can do light activities around the house but nothing strenuous for several days. This care sheet gives you a general idea about how long it will take for you to recover. But each person recovers at a different pace. Follow the steps below to feel better as quickly as possible. How can you care for yourself at home? Activity    · If the doctor gave you a sedative:  ? For 24 hours, don't do anything that requires attention to detail, such as going to work, making important decisions, or signing any legal documents. It takes time for the medicine's effects to completely wear off.  ? For your safety, do not drive or operate any machinery that could be dangerous. Wait until the medicine wears off and you can think clearly and react easily.     · Do not do strenuous exercise and do not lift, pull, or push anything heavy until your doctor says it is okay. This may be for a day or two.  You can walk around the house and do light activity, such as cooking.     · If the catheter was placed in your groin, try not to walk up stairs for the first couple of days.     · If the catheter was placed in your arm near your wrist, do not bend your wrist deeply for the first couple of days. Be careful using your hand to get into and out of a chair or bed.     · If your doctor recommends it, get more exercise. Walking is a good choice. Bit by bit, increase the amount you walk every day. Try for at least 30 minutes on most days of the week. Diet    · Drink plenty of fluids to help your body flush out the dye. If you have kidney, heart, or liver disease and have to limit fluids, talk with your doctor before you increase the amount of fluids you drink.     · Keep eating a heart-healthy diet that has lots of fruits, vegetables, and whole grains. If you have not been eating this way, talk to your doctor. You also may want to talk to a dietitian. This expert can help you to learn about healthy foods and plan meals. Medicines    · Your doctor will tell you if and when you can restart your medicines. He or she will also give you instructions about taking any new medicines.     · If you take aspirin or some other blood thinner, ask your doctor if and when to start taking it again. Make sure that you understand exactly what your doctor wants you to do.     · Your doctor may prescribe a blood-thinning medicine like aspirin or clopidogrel (Plavix). It is very important that you take these medicines exactly as directed in order to keep the coronary artery open and reduce your risk of a heart attack. Be safe with medicines. Call your doctor if you think you are having a problem with your medicine. Care of the catheter site    · For 1 or 2 days, keep a bandage over the spot where the catheter was inserted. The bandage probably will fall off in this time.     · Put ice or a cold pack on the area for 10 to 20 minutes at a time to help with soreness or swelling. Put a thin cloth between the ice and your skin.     · You may shower 24 to 48 hours after the procedure, if your doctor okays it.  Pat the incision dry.     · Do not soak the catheter site until it is healed. Don't take a bath for 1 week, or until your doctor tells you it is okay.     · Watch for bleeding from the site. A small amount of blood (up to the size of a quarter) on the bandage can be normal.     · If you are bleeding, lie down and press on the area for 15 minutes to try to make it stop. If the bleeding does not stop, call your doctor or seek immediate medical care. Follow-up care is a key part of your treatment and safety. Be sure to make and go to all appointments, and call your doctor if you are having problems. It's also a good idea to know your test results and keep a list of the medicines you take. When should you call for help? Call 911 anytime you think you may need emergency care. For example, call if:    · You passed out (lost consciousness).     · You have severe trouble breathing.     · You have sudden chest pain and shortness of breath, or you cough up blood.     · You have symptoms of a heart attack. These may include:  ? Chest pain or pressure, or a strange feeling in the chest.  ? Sweating. ? Shortness of breath. ? Nausea or vomiting. ? Pain, pressure, or a strange feeling in the back, neck, jaw, or upper belly, or in one or both shoulders or arms. ? Lightheadedness or sudden weakness. ? A fast or irregular heartbeat. After you call 911, the  may tel you to chew 1 adult-strength or 2 to 4 low-dose aspirin. Wait for an ambulance. Do not try to drive yourself.     · You have been diagnosed with angina, and you have symptoms that do not go away with rest or are not getting better within 5 minutes after you take a dose of nitroglycerin. Call your doctor now or seek immediate medical care if:    · You are bleeding from the area where the catheter was put in your artery.     · You have a fast-growing, painful lump at the catheter site.     · You have signs of infection, such as:  ? Increased pain, swelling, warmth, or redness.   ? Red streaks leading from the catheter site.  ? Pus draining from the catheter site. ? A fever.     · Your leg, arm, or hand is painful, looks blue, or feels cold, numb, or tingly. Watch closely for changes in your health, and be sure to contact your doctor if you have any problems. Where can you learn more? Go to http://www.gray.com/  Enter D192 in the search box to learn more about \"Coronary Angiogram: What to Expect at Home. \"  Current as of: August 31, 2020               Content Version: 12.8  © 2006-2021 [employer]. Care instructions adapted under license by [employer] (which disclaims liability or warranty for this information).  If you have questions about a medical condition or this instruction, always ask your healthcare professional. [address] any warranty or liability for your use of this information.

## 2021-07-07 NOTE — PROGRESS NOTES
TRANSFER - OUT REPORT:    Verbal report given to ASAEL Wetzel(name) on Diego Barber  being transferred to 2 (unit) for routine progression of care       Report consisted of patients Situation, Background, Assessment and   Recommendations(SBAR). Information from the following report(s) Procedure Summary, Intake/Output, MAR and Recent Results was reviewed with the receiving nurse. Lines:   Peripheral IV 07/05/21 Left Antecubital (Active)   Site Assessment Clean, dry, & intact 07/07/21 0845   Phlebitis Assessment 0 07/07/21 0845   Infiltration Assessment 0 07/07/21 0845   Dressing Status Clean, dry, & intact 07/07/21 0845   Dressing Type Transparent 07/07/21 0845   Hub Color/Line Status Infusing 07/07/21 0845   Action Taken Open ports on tubing capped 07/07/21 0845   Alcohol Cap Used Yes 07/07/21 0845        Opportunity for questions and clarification was provided.       Patient transported with:   Monitor  Registered Nurse

## 2021-07-07 NOTE — PROGRESS NOTES
TRANSFER - IN REPORT:    Verbal report received from [address] RN(name) on Climmie Re  being received from cath lab(unit) for routine post - op      Report consisted of patients Situation, Background, Assessment and   Recommendations(SBAR). Information from the following report(s) Procedure Summary was reviewed with the receiving nurse. Opportunity for questions and clarification was provided. Assessment completed upon patients arrival to unit and care assumed.

## 2021-07-07 NOTE — PROGRESS NOTES
Cardiology Progress Note  2021     Admit Date: 2021  Admit Diagnosis: Chest pain, rule out acute myocardial infarction [R07.9]  Chest pain [R07.9]  CC: none currently    Assessment:   Active Problems:    Chest pain, rule out acute myocardial infarction (2021)      Chest pain (2021)      Plan:     LHC showed no obstructive disease with markedly elevated LVEDP and elevated LVEF  Restart plavix and diuretic at higher dose  Echo with no concerning findings  Will arrange for FU with Dr. Talisha Mckeon, [facility] for DC tonight from cardiac perspective if adequate hemostasis from cath site    Subjective:      Rosa Isela Kilpatrick has no c/o. LHC without issue    Objective:    Physical Exam:  Overall VSSAF;    Visit Vitals  /64 (BP 1 Location: Left arm, BP Patient Position: At rest)   Pulse 78   Temp 97.5 °F (36.4 °C)   Resp 14   Ht 5' 9\" (1.753 m)   Wt 131 kg (288 lb 12.8 oz)   SpO2 (!) 88%   BMI 42.65 kg/m²     Temp (24hrs), Av.2 °F (36.8 °C), Min:97.5 °F (36.4 °C), Max:98.5 °F (36.9 °C)    Patient Vitals for the past 8 hrs:   Pulse   21 1533 78   21 1531 81   21 1403 92   21 1212 78    Patient Vitals for the past 8 hrs:   Resp   21 1533 14   21 1531 16   21 1403 21   21 1212 18    Patient Vitals for the past 8 hrs:   BP   21 1533 124/64   21 1531 134/68   21 1403 (!) 161/86   21 1212 (!) 149/52      07/ 1901 -  0700  In: 840 [P.O.:840]  Out: -       General Appearance: Well developed, well nourished, no acute distress. Ears/Nose/Mouth/Throat:   Normal MM; anicteric. JVP: WNL   Resp:   Lungs clear to auscultation bilaterally. Nl resp effort. Cardiovascular:  RRR, S1, S2 normal, no new murmur. No gallop or rub. Abdomen:   Soft, non-tender, bowel sounds are present. Extremities: No edema bilaterally. Skin:  Neuro: Warm and dry.   A/O x3, grossly nonfocal                         Data Review:     Telemetry independently reviewed :   normal sinus rhythm       ECG independently reviewed: NSR  Labs:   Recent Results (from the past 24 hour(s))   PTT    Collection Time: 07/06/21  8:14 PM   Result Value Ref Range    aPTT 44.1 (H) 22.1 - 31.0 sec    aPTT, therapeutic range     58.0 - 60.8 SECS   METABOLIC PANEL, BASIC    Collection Time: 07/07/21  3:38 AM   Result Value Ref Range    Sodium 139 136 - 145 mmol/L    Potassium 4.1 3.5 - 5.1 mmol/L    Chloride 108 97 - 108 mmol/L    CO2 27 21 - 32 mmol/L    Anion gap 4 (L) 5 - 15 mmol/L    Glucose 127 (H) 65 - 100 mg/dL    BUN 12 6 - 20 MG/DL    Creatinine 0.93 0.70 - 1.30 MG/DL    BUN/Creatinine ratio 13 12 - 20      GFR est AA >60 >60 ml/min/1.73m2    GFR est non-AA >60 >60 ml/min/1.73m2    Calcium 8.8 8.5 - 10.1 MG/DL   MAGNESIUM    Collection Time: 07/07/21  3:38 AM   Result Value Ref Range    Magnesium 2.3 1.6 - 2.4 mg/dL   CBC W/O DIFF    Collection Time: 07/07/21  3:38 AM   Result Value Ref Range    WBC 7.6 4.1 - 11.1 K/uL    RBC 2.66 (L) 4.10 - 5.70 M/uL    HGB 8.1 (L) 12.1 - 17.0 g/dL    HCT 26.7 (L) 36.6 - 50.3 %    .4 (H) 80.0 - 99.0 FL    MCH 30.5 26.0 - 34.0 PG    MCHC 30.3 30.0 - 36.5 g/dL    RDW 13.2 11.5 - 14.5 %    PLATELET 314 022 - 690 K/uL    MPV 11.9 8.9 - 12.9 FL    NRBC 0.0 0  WBC    ABSOLUTE NRBC 0.00 0.00 - 0.01 K/uL   PTT    Collection Time: 07/07/21  3:38 AM   Result Value Ref Range    aPTT 73.6 (H) 22.1 - 31.0 sec    aPTT, therapeutic range     58.0 - 77.0 SECS   PTT    Collection Time: 07/07/21 10:31 AM   Result Value Ref Range    aPTT 84.9 (H) 22.1 - 31.0 sec    aPTT, therapeutic range     58.0 - 77.0 SECS      Current medications reviewed       Jessica Valdez MD

## 2021-07-07 NOTE — DISCHARGE SUMMARY
Discharge Summary     Patient:  Indiana Wisdom       MRN: [id number]       YOB: 1950       Age: 72 y.o. Date of admission:  7/5/2021    Date of discharge:  7/7/2021    Primary care provider: Dr. Ade Greco MD    Admitting provider:  Tera Yang MD    Discharging provider:  Josefina Elizabeth [address].: [phone]. If unavailable, call [phone] and ask the  to page the triage hospitalist.    Consultations  · IP CONSULT TO HOSPITALIST  · IP CONSULT TO CARDIOLOGY  · IP CONSULT TO UROLOGY    Procedures  · Procedure(s):  · LEFT HEART CATH / CORONARY ANGIOGRAPHY    Discharge destination: home . The patient is stable for discharge. Admission diagnosis  · Chest pain, rule out acute myocardial infarction [R07.9]  · Chest pain [R07.9]    Current Discharge Medication List      START taking these medications    Details   clopidogreL (PLAVIX) 75 mg tab Take 1 Tablet by mouth daily. Qty: 30 Tablet, Refills: 0  Start date: 7/8/2021      ferrous sulfate 325 mg (65 mg iron) tablet Take 1 Tablet by mouth daily (with breakfast). Qty: 30 Tablet, Refills: 0  Start date: 7/8/2021      nitroglycerin (NITROSTAT) 0.4 mg SL tablet 1 Tablet by SubLINGual route as needed for Chest Pain. Up to 3 doses. Qty: 1 Bottle, Refills: 0  Start date: 7/7/2021         CONTINUE these medications which have CHANGED    Details   furosemide (LASIX) 40 mg tablet Take 1 Tablet by mouth daily. Qty: 30 Tablet, Refills: 0  Start date: 7/7/2021         CONTINUE these medications which have NOT CHANGED    Details   atorvastatin (Lipitor) 40 mg tablet Take 40 mg by mouth nightly. metoprolol succinate (TOPROL-XL) 25 mg XL tablet Take 50 mg by mouth nightly. aspirin 81 mg chewable tablet Take 81 mg by mouth daily.          STOP taking these medications       amLODIPine (NORVASC) 5 mg tablet Comments:   Reason for Stopping: Follow-up Information     Follow up With Specialties Details Why Contact Info Additional Information    [address] Cardiac Rehabilitation Call after discharge to discuss enrollment for OP program after [address] [facility] José [address] [facility],4Th Floor [address] [facility] [address], [address]. Please arrive 15 minutes prior to your appointment time and you will register in the [place] [address], [address], on the first floor of the [address] Telephone: [phone] Fax: [phone] Driving directions To [facility] [place]. Building: Driving WEST on [address], take exit [address] to [employer]. Turn left onto [address], then turn right into [employer]ing lot Driving EAST [address], take [address] [facility]. Turn right at the end of the exit ramp. Turn left onto [address], then turn right into [employer] lot. Evan Arteaga MD Internal Medicine In 1 week  [address] [facility] [address]  [address] [facility]  [phone]       Anirudh Guillory MD Cardiology In 1 week  [address] [facility]  [address] [facility]  [address] [facility] [address]  [phone]             Final discharge diagnoses and brief hospital course  [place] Ned a 72 y. o. male with a past medical history of CAD status post 3 stents, dyslipidemia, and BPH,  who presents as a transfer from [place] [facility] for chest pain.  Of note, his last catheterization was performed in April 2019 when he received a drug-eluting stent to the LAD for 90% stenosis.  He states that the pain has been present for the past 2 days approximately. Yuridia Clemente is described as a sharp pain in the left chest, and is nonradiating.  The pain improves when he gets up and walks around, and is worse when he is lying down.  There are no associated symptoms, however, he has had worsening dyspnea with exertion for the past 2 to 3 months.  He is followed by  [facility] for cardiology, and states that he has had an echocardiogram, and stress test recently.  I do not have access to these records.     In the ED, he was placed on 2 L nasal cannula with minimum documented SPO2 of 92%. Labs were significant for hyperglycemia to 207, normocytic anemia with hemoglobin of 8.3, troponins negative, and proBNP 184 (patient is obese), and D-dimer was normal. Chest x-ray showed no acute cardiopulmonary process. EKG showed normal sinus rhythm, with left axis deviation.   In the ED, he received 324 chewable aspirin, GI cocktail, Nitrostat, Tylenol, and 500 cc bolus. # Acute Chest Pain - resolved   -Pain is sharp, left-sided, nonradiating, worse with lying down, and improved when he gets up and walks around.  It did not improve with nitroglycerin, and is not reproducible with palpation.   - Elevated troponin 0.25 from <0.05  - s/p heparin gtt  - echo: LVEF is 60 - 65%. Mild (grade 1) left ventricular diastolic dysfunction  - appreciate cardiology input   - LHC done 7/7: no obstructive disease with markedly elevated LVEDP and elevated LVEF   - Restart plavix and diuretic at higher dose  - ok to DC from cardiology      #Normocytic anemia  -hemoglobin is 8.3 on poa which is down from 14.5 in November 2020  -Patient states that he had a prostate procedure in May 0104 that was complicated by postprocedural hematuria  - iron deficient - added po iron supplements. fobt negative   - hb low, will monitor hb, transfuse <7  - appreciate urology input - no concerns for proceeding with LHC      # Hx CAD s/p stents in 2019  #Dyslipidemia  -Continue aspirin, metoprolol, and lipitor     #BPH  - pt stopped taking Flomax, and proscar     Obses Body mass index is 42.93 kg/m².  recommend weight loss     Discharge recommendations:  PCP f/u in 1 week  Cardiology in 1-2 weeks  New scripts sent to pharmacy           Physical examination at discharge  Visit Vitals  /79 (BP 1 Location: Left arm, BP Patient Position: At rest)   Pulse 85   Temp 97.5 °F (36.4 °C)   Resp 16   Ht 5' 9\" (1.753 m)   Wt 131 kg (288 lb 12.8 oz)   SpO2 99%   BMI 42.65 kg/m²     Gen: NAD, obese   HEENT: anicteric sclerae, normal conjunctiva, oropharynx clear, MM moist  Neck: supple, trachea midline, no adenopathy  Heart: RRR, no MRG, no JVD, no peripheral edema  Lungs: CTA b/l, non-labored respirations  Abd: soft, NT, ND, BS+, no organomegaly  Extr: warm  Skin: dry, no rash  Neuro: CN II-XII grossly intact, normal speech, moves all extremities  Psych: normal mood, appropriate affect    Pertinent imaging studies:    Per EMR    Recent Labs     07/07/21 0338 07/06/21 0038 07/05/21  0017   WBC 7.6 7.3 7.7   HGB 8.1* 7.9* 8.3*   HCT 26.7* 25.6* 27.1*    204 222     Recent Labs     07/07/21  0338 07/06/21 0038 07/05/21  0017    141 139   K 4.1 4.0 3.5    107 103   CO2 27 28 26   BUN 12 17 16   CREA 0.93 0.96 1.05   * 117* 207*   CA 8.8 8.4* 8.5   MG 2.3 2.2  --      Recent Labs     07/05/21  0017   AP 90   TP 6.5   ALB 3.3*   GLOB 3.2     Recent Labs     07/07/21  1031 07/07/21 0338 07/06/21 2014   APTT 84.9* 73.6* 44.1*      Recent Labs     07/05/21  0706   TIBC 338   PSAT 8*   FERR 13*      No results for input(s): PH, PCO2, PO2 in the last 72 hours. No results for input(s): CPK, CKMB in the last 72 hours.     No lab exists for component: TROPONINI  No components found for: [address] [facility]    Chronic Diagnoses:    Problem List as of 7/7/2021 Date Reviewed: 11/16/2020        Codes Class Noted - Resolved    Chest pain ICD-10-CM: R07.9  ICD-9-CM: 786.50  7/7/2021 - Present        Chest pain, rule out acute myocardial infarction ICD-10-CM: R07.9  ICD-9-CM: 786.50  7/5/2021 - Present        Elevated PSA ICD-10-CM: R97.20  ICD-9-CM: 790.93  11/16/2020 - Present    Overview Signed 11/16/2020  4:06 PM by MD Dr Jason Ray biopsy neg             FH: colon cancer ICD-10-CM: Z80.0  ICD-9-CM: V16.0  11/16/2020 - Present        Unstable angina (HCC) ICD-10-CM: I20.0  ICD-9-CM: 411.1  4/2/2019 - Present              Time spent on discharge related activities today greater than 30 minutes.       Signed:  Jaden Davila MD                 Hospitalist, Internal Medicine      Cc: Belia Funk MD

## 2021-07-07 NOTE — PROGRESS NOTES
Cardiac Cath Lab Recovery Arrival Note:      Josette Boast arrived to Cardiac Cath Lab, Recovery Area. Staff introduced to patient. Patient identifiers verified with NAME and DATE OF BIRTH. Procedure verified with patient. Consent forms reviewed and signed by patient or authorized representative and verified. Allergies verified. Patient and family oriented to department. Patient and family informed of procedure and plan of care. Questions answered with review. Patient prepped for procedure, per orders from physician, prior to arrival.    Patient on cardiac monitor, non-invasive blood pressure, SPO2 monitor. On RA. Patient is A&Ox 4. Patient reports no complaints of chest pain or shortness of breath. Patient in stretcher, in low position, with side rails up, call bell within reach, patient instructed to call if assistance as needed. Patient prep in: [address], [place] [address]. Prep by: CONSUELO Roldan RN.

## 2021-07-07 NOTE — PROGRESS NOTES
Problem: Falls - Risk of  Goal: *Absence of Falls  Description: Document Tamara Padilla Fall Risk and appropriate interventions in the flowsheet.   Outcome: Progressing Towards Goal  Note: Fall Risk Interventions:            Medication Interventions: Evaluate medications/consider consulting pharmacy

## 2021-07-07 NOTE — PROGRESS NOTES
Spoke with Collin Castro in Cath lab who spoke with Dr Olinda Rainey and they will turn heparin drip off when they take him downstairs pre procedure,  estimated time is  today. Will continue to monitor.

## 2021-07-07 NOTE — PROGRESS NOTES
TRANSFER - IN REPORT:    Verbal report received from HERBERTRT on Nba Kaufman , from the Cardiac Cath lab, for routine progression of care. Report consisted of patients Situation, Background, Assessment and Recommendations(SBAR). Information from the following report(s) Procedure Summary, Intake/Output, MAR and Recent Results was reviewed with the receiving clinician. Opportunity for questions and clarification was provided. Assessment completed upon patients arrival to [address] [place] [address] and care assumed. Cardiac Cath Lab Recovery Arrival Note:     Nba Kaufman arrived to [facility] recovery area. Patient procedure= lhc. Patient on cardiac monitor, non-invasive blood pressure, Patient status doing well without problems. Patient is A&Ox 4. Patient reports no complaints. Procedure site without any bleeding and no hematoma.

## 2021-07-07 NOTE — PROGRESS NOTES
Cardiac Cath Lab Procedure Area Arrival Note:    Skip Age arrived to Cardiac Cath Lab, Procedure Area. Patient identifiers verified with NAME and DATE OF BIRTH. Procedure verified with patient. Consent forms verified. Allergies verified. Patient informed of procedure and plan of care. Questions answered with review. Patient voiced understanding of procedure and plan of care. Patient on cardiac monitor, non-invasive blood pressure, SPO2 monitor. On  or O2 @ 2 lpm via NC.  IV of NS on pump at 50 ml/hr. Patient status doing well without problems. Patient is A&Ox 4. Patient reports no current chest pain or shortness of breath. Patient medicated during procedure with orders obtained and verified by  [facility]. Refer to patients Cardiac Cath Lab PROCEDURE REPORT for vital signs, assessment, status, and response during procedure, printed at end of case. Printed report on chart or scanned into chart.

## 2021-07-07 NOTE — PROGRESS NOTES
Approximately 1622: Wife called the cath lab, and updated by RN about the TR band and that the patient will go back to his room once the TR band is removed, but that it wont be for another two hours or so from now. 1720:  Dr. Tiny Collins called and made aware that the family would like to speak to him.

## 2021-07-07 NOTE — PROGRESS NOTES
Bedside and Verbal shift change report given to Meera Mendes RN (oncoming nurse) by Emily Lovett RN (offgoing nurse). Report included the following information SBAR, Kardex and MAR       Verify heparin drip.

## 2021-07-07 NOTE — PROGRESS NOTES
TRANSFER - OUT REPORT:    Verbal report given to RT Claudy (R) on [facility] being transferred to Cath Lab Recovery for routine progression of care       Report consisted of patients Situation, Background, Assessment and   Recommendations(SBAR). Information from the following report(s) SBAR, Procedure Summary and MAR was reviewed with the receiving nurse. Opportunity for questions and clarification was provided.

## 2021-07-08 ENCOUNTER — PATIENT OUTREACH (OUTPATIENT)
Dept: CASE MANAGEMENT | Age: 71
End: 2021-07-08

## 2021-07-09 NOTE — PROGRESS NOTES
Care Transitions Outreach Attempt    Call within 2 business days of discharge: Yes   Attempted to reach patient for transitions of care follow up. Unable to reach patient. Patient: Eliud Stratton Patient : 1950 MRN: [id number]    Last Discharge [facility] Facility       Complaint Diagnosis Description Type Department Provider    21 Chest Pain Chest pain, unspecified type . .. ED to Hosp-Admission (Discharged) (ADMIT) Roby Becerril MD; Odette Aguilera. .. Was this an external facility discharge? No     Noted following upcoming appointments from discharge chart review:   [facility] follow up appointment(s): No future appointments. Non-[facility] follow up appointment(s): n/a          START taking these medications     Details   clopidogreL (PLAVIX) 75 mg tab Take 1 Tablet by mouth daily. Qty: 30 Tablet, Refills: 0  Start date: 2021       ferrous sulfate 325 mg (65 mg iron) tablet Take 1 Tablet by mouth daily (with breakfast). Qty: 30 Tablet, Refills: 0  Start date: 2021       nitroglycerin (NITROSTAT) 0.4 mg SL tablet 1 Tablet by SubLINGual route as needed for Chest Pain. Up to 3 doses. Qty: 1 Bottle, Refills: 0  Start date: 2021                 CONTINUE these medications which have CHANGED     Details   furosemide (LASIX) 40 mg tablet Take 1 Tablet by mouth daily. Qty: 30 Tablet, Refills: 0  Start date: 2021                 CONTINUE these medications which have NOT CHANGED     Details   atorvastatin (Lipitor) 40 mg tablet Take 40 mg by mouth nightly.       metoprolol succinate (TOPROL-XL) 25 mg XL tablet Take 50 mg by mouth nightly.       aspirin 81 mg chewable tablet Take 81 mg by mouth daily.                STOP taking these medications         amLODIPine (NORVASC) 5 mg tablet Comments:   Reason for Stoppin/09/21  Unable to reach patient for [employer] call. VM left. CTN to attempt to reach in 5 business days.  AR

## 2021-07-21 ENCOUNTER — PATIENT OUTREACH (OUTPATIENT)
Dept: CASE MANAGEMENT | Age: 71
End: 2021-07-21

## 2021-07-21 NOTE — PROGRESS NOTES
07/21/21  Unable to reach patient for [facility] call. Episode resolved at this time.  AR

## 2021-08-03 PROBLEM — R07.9 CHEST PAIN: Status: RESOLVED | Noted: 2021-07-07 | Resolved: 2021-08-03

## 2021-08-05 NOTE — ROUTINE PROCESS
Bedside shift change report given to miguel a garvey rn (oncoming nurse) by Gisselle Mendez (offgoing nurse). Report included the following information SBAR and Kardex.
Pt returned from cardiac cath recovery room, VSS, pt anxious for discharge. Vitals taken q30min x 2. Pt given discharge instructions with understanding verbalized. Pt discharged to home accompanied by wife.
Add Pregnancy And Lactation Warning To Medication Counseling?: No
Cosentyx Monitoring Guidelines: A yearly test for tuberculosis is required while taking Cosentyx.
Diagnosis (Required): Psoriasis
Pregnancy And Lactation Warning Text: This medication is Pregnancy Category B and is considered safe during pregnancy. It is unknown if this medication is excreted in breast milk.
Detail Level: Zone
Cosentyx Dosing: 300 mg SC week 0, 1, 2, 3, and 4 then every 4 weeks after that

## 2022-02-28 NOTE — Clinical Note
Multiple views of the left main, LAD and circumflex artery obtained using power injection. 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 female, who is followed by the specialty pharmacy service for management and education.    Recent Encounters     Date Type Provider Description    02/28/2022 Specialty Pharmacy Gatito Mcmullen, Amish Initial Clinical Assessment    01/13/2022 Specialty Pharmacy Destinee Luna PharmD Referral Authorization        Clinical call attempts since last clinical assessment   No call attempts found.     Current Outpatient Medications   Medication Sig    acetaminophen (TYLENOL) 500 MG tablet Take 2 tablets (1,000 mg total) by mouth every 8 (eight) hours as needed for Pain.    adalimumab (HUMIRA PEN) PnKt injection Inject 1 pen (40 mg total) into the skin every 14 (fourteen) days.    calcium carbonate (TUMS) 200 mg calcium (500 mg) chewable tablet Take 2 tablets (1,000 mg total) by mouth once daily.    DULoxetine (CYMBALTA) 30 MG capsule Take 1 capsule (30 mg total) by mouth once daily.    folic acid (FOLVITE) 1 MG tablet Take 1 tablet (1 mg total) by mouth 3 (three) times daily.    methotrexate 25 mg/mL injection Inject 1 mL (25 mg) into the skin weekly    ondansetron (ZOFRAN) 4 MG tablet 1 to 2 tablets a day for nausea    pneumoc 13-artie conj-dip cr,PF, (PREVNAR 13, PF,) 0.5 mL Syrg PCV 13 first and PPSV 23 after 8 weeks (Patient not taking: Reported on 12/3/2021)    pneumococcal vaccine (PNU-IMMUNE 23) 25 mcg/0.5 mL injection PCV 13 first and PPSV 23 after 8 weeks (Patient not taking: Reported on 12/3/2021)    promethazine (PHENERGAN) 25 MG tablet TAKE 1 TABLET(25 MG) BY MOUTH EVERY 4 HOURS    vitamin D (VITAMIN  D3) 1000 units Tab Take 1 tablet (1,000 Units total) by mouth once daily.   Last reviewed on 2/28/2022  2:17 PM by Jackie Pastor, PharmD    Review of patient's allergies indicates:  No Known AllergiesLast reviewed on  2/28/2022 2:18 PM by Jackie Pastor    Drug Interactions    Drug interactions evaluated: yes  Clinically relevant drug interactions identified: no  Provided the patient with educational material regarding drug interactions: not applicable         Adverse Effects    Activity change: Pos       Assessment Questions - Documented Responses    Flowsheet Row Most Recent Value   Assessment    Medication Reconciliation completed for patient Yes   During the past 4 weeks, has patient missed any activities due to condition or medication? Missed School, Missed Planned Activities   Number of Days missed 8   During the past 4 weeks, did patient have any of the following urgent care visits? None   Goals of Therapy Status Discussed (new start)   Status of the patients ability to self-administer: Is Able   All education points have been covered with patient? Yes, supplemental printed education provided   Welcome packet contents reviewed and discussed with patient? Yes   Assesment completed? Yes   Plan Therapy being initiated   Do you need to open a clinical intervention (i-vent)? No   Do you want to schedule first shipment? Yes   Medication #1 Assessment Info    Patient status New medication   Is this medication appropriate for the patient? Yes   Is this medication effective? Not yet started   Medication #2 Assessment Info    Patient status New medication   Is this medication appropriate for the patient? Yes   Is this medication effective? Not yet started        Refill Questions - Documented Responses    Flowsheet Row Most Recent Value   Patient Availability and HIPAA Verification    Does patient want to proceed with activity? Yes   HIPAA/medical authority confirmed? Yes   Relationship to patient of person spoken to?  "Self   Refill Screening Questions    When does the patient need to receive the medication? 02/26/22   Refill Delivery Questions    How will the patient receive the medication? Delivery Shanae   When does the patient need to receive the medication? 02/26/22   Shipping Address Home   Address in [facility] confirmed and updated if neccessary? Yes   Expected Copay ($) 18.34   Is the patient able to afford the medication copay? Yes   Payment Method CC on file   Days supply of Refill 28   Supplies needed? No supplies needed   Refill activity completed? Yes   Refill activity plan Refill scheduled   Shipment/Pickup Date: 03/02/22          Objective    She has no past medical history on file.    Tried/failed medications: oral MTX    BP Readings from Last 4 Encounters:   02/15/22 (!) 161/104   01/20/22 110/62   12/14/21 (!) 156/104   12/03/21 (!) 154/82     Ht Readings from Last 4 Encounters:   01/20/22 5' 4" (1.626 m)   12/16/21 5' 4.02" (1.626 m)   12/13/21 5' 4" (1.626 m)   12/03/21 5' 5" (1.651 m)     Wt Readings from Last 4 Encounters:   02/15/22 (!) 166.1 kg (366 lb 2.9 oz)   02/15/22 (!) 166.1 kg (366 lb 2.9 oz)   01/20/22 (!) 161.5 kg (356 lb)   12/16/21 (!) 162 kg (357 lb 2.3 oz)     Recent Labs   Lab Result Units 02/10/22  0910   Creatinine mg/dL 0.6   ALT U/L 54 H   AST U/L 31     The goals of prescribed drug therapy management include:  · Supporting patient to meet the prescriber's medical treatment objectives  · Improving or maintaining quality of life  · Maintaining optimal therapy adherence  · Minimizing and managing side effects      Goals of Therapy Status: Discussed (new start)    Assessment/Plan  Patient plans to start therapy on 02/26/22.  Pt changing from MTX 9 tabs (22.5 mg) weekly to 25 mg SQ weekly. She injects MTX on Saturdays so will start injectable MTX this Saturday.  She will start Humira on Sunday.   Pt is on folic acid 3 mg daily.   Recommended for patient to use calendar to track her " dsoes.  She reports her main Takayasu Arteritis are fatigue and hand pain related to blockage.      Humira 40 mg every 14 days AND MTX 25 mg weekly is appropriate for Takayasu Arteritis.  TB negative 10/29/21.  HepB immune due to vax 10/29/21. Chemistry 2/10/22 WNL except ALT is 54, will be rechecked.  CBC essentially WNL 2/10/22.      Indication, dosage, appropriateness, effectiveness, safety and convenience of her specialty medication(s) were reviewed today.     Patient Education   Patient received education on the following:    Expectations and possible outcomes of therapy   Proper use, timely administration, and missed dose management   Duration of therapy   Side effects, including prevention, minimization, and management   Contraindications and safety precautions   New or changed medications, including prescribe and over the counter medications and supplements   Reviews recommended vaccinations, as appropriate   Storage, safe handling, and disposal      Tasks added this encounter   3/18/2022 - Refill Call (Auto Added)  11/28/2022 - Clinical - Follow Up Assesement (Annual)   Tasks due within next 3 months   No tasks due.     Jackie Pastor, PharmD  [facility]  [address] [facility] [address]  Phone: [phone]  Fax: [phone]

## 2022-03-18 PROBLEM — I20.0 UNSTABLE ANGINA (HCC): Status: ACTIVE | Noted: 2019-04-02

## 2022-03-18 PROBLEM — R07.9 CHEST PAIN, RULE OUT ACUTE MYOCARDIAL INFARCTION: Status: ACTIVE | Noted: 2021-07-05

## 2022-03-18 PROBLEM — R97.20 ELEVATED PSA: Status: ACTIVE | Noted: 2020-11-16

## 2022-03-19 PROBLEM — Z80.0 FH: COLON CANCER: Status: ACTIVE | Noted: 2020-11-16

## 2022-09-23 ENCOUNTER — OFFICE VISIT (OUTPATIENT)
Dept: INTERNAL MEDICINE CLINIC | Age: 72
End: 2022-09-23
Payer: MEDICARE

## 2022-09-23 VITALS
OXYGEN SATURATION: 96 % | RESPIRATION RATE: 16 BRPM | SYSTOLIC BLOOD PRESSURE: 135 MMHG | WEIGHT: 270.8 LBS | HEART RATE: 79 BPM | BODY MASS INDEX: 40.11 KG/M2 | HEIGHT: 69 IN | TEMPERATURE: 98.6 F | DIASTOLIC BLOOD PRESSURE: 75 MMHG

## 2022-09-23 DIAGNOSIS — Z20.822 CLOSE EXPOSURE TO COVID-19 VIRUS: ICD-10-CM

## 2022-09-23 DIAGNOSIS — J40 BRONCHITIS: Primary | ICD-10-CM

## 2022-09-23 PROCEDURE — G8536 NO DOC ELDER MAL SCRN: HCPCS | Performed by: NURSE PRACTITIONER

## 2022-09-23 PROCEDURE — 3017F COLORECTAL CA SCREEN DOC REV: CPT | Performed by: NURSE PRACTITIONER

## 2022-09-23 PROCEDURE — G8427 DOCREV CUR MEDS BY ELIG CLIN: HCPCS | Performed by: NURSE PRACTITIONER

## 2022-09-23 PROCEDURE — G8432 DEP SCR NOT DOC, RNG: HCPCS | Performed by: NURSE PRACTITIONER

## 2022-09-23 PROCEDURE — G8417 CALC BMI ABV UP PARAM F/U: HCPCS | Performed by: NURSE PRACTITIONER

## 2022-09-23 PROCEDURE — 99213 OFFICE O/P EST LOW 20 MIN: CPT | Performed by: NURSE PRACTITIONER

## 2022-09-23 PROCEDURE — 1101F PT FALLS ASSESS-DOCD LE1/YR: CPT | Performed by: NURSE PRACTITIONER

## 2022-09-23 PROCEDURE — 87426 SARSCOV CORONAVIRUS AG IA: CPT | Performed by: NURSE PRACTITIONER

## 2022-09-23 RX ORDER — AMLODIPINE BESYLATE 5 MG/1
5 TABLET ORAL DAILY
COMMUNITY

## 2022-09-23 RX ORDER — FUROSEMIDE 20 MG/1
20 TABLET ORAL DAILY
COMMUNITY

## 2022-09-23 RX ORDER — AZITHROMYCIN 250 MG/1
250 TABLET, FILM COATED ORAL SEE ADMIN INSTRUCTIONS
Qty: 6 TABLET | Refills: 0 | Status: SHIPPED | OUTPATIENT
Start: 2022-09-23 | End: 2022-09-28

## 2022-09-25 PROBLEM — I25.119 CORONARY ARTERY DISEASE INVOLVING NATIVE CORONARY ARTERY OF NATIVE HEART WITH ANGINA PECTORIS (HCC): Status: ACTIVE | Noted: 2022-09-25

## 2022-09-25 LAB — SARS-COV-2 POC: NEGATIVE

## 2022-09-25 NOTE — PROGRESS NOTES
María Kaplan (: 1950) is a 80 y.o. male, established patient, here for evaluation of the following chief complaint(s):  Cough (2 wks - neg COVID )       ASSESSMENT/PLAN:  Below is the assessment and plan developed based on review of pertinent history, physical exam, labs, studies, and medications. 1. Bronchitis  -     guaiFENesin-dextromethorphan SR (Mucinex DM) 600-30 mg per tablet; Take 1 Tablet by mouth every twelve (12) hours as needed for Cough., No Print, Disp-30 Tablet, R-0  -     azithromycin (ZITHROMAX) 250 mg tablet; Take 1 Tablet by mouth See Admin Instructions for 5 days. , Normal, Disp-6 Tablet, R-0    2. Close exposure to COVID-19 virus -- covid test negative in office. Due for routine follow up appointment in office with Dr Sandy Kiran:  HPI    Patient of Dr Rosa Isela Dunlap who was last seen in office 2020 for new patient appointment presents with complaints of persistent cough that began about 2 weeks ago and has been productive of purulent mucous. Denies fever, chills, shortness of breath, wheezing. Has not been taking any OTC medications for current symptoms. Reports his wife was recently diagnosed with Covid infection and then developed sinusitis afterwards and is on antibiotics currently. Has been seeing Cardiology Dr Hany Lacy for CAD and is no longer taking Plavix. Denies chest pain, shortness of breath.     Patient Active Problem List   Diagnosis Code    Unstable angina (HCC) I20.0    Elevated PSA R97.20    FH: colon cancer Z80.0    Chest pain, rule out acute myocardial infarction R07.9    Coronary artery disease involving native coronary artery of native heart with angina pectoris ([facility] [address] I25.119     Past Surgical History:   Procedure Laterality Date    HX ORTHOPAEDIC      l sholder surg    HX SHOULDER REPLACEMENT Right     ND CARDIAC SURG PROCEDURE UNLIST      3 stents     Social History     Socioeconomic History    Marital status:      Spouse name: Not on file    Number of children: Not on file    Years of education: Not on file    Highest education level: Not on file   Occupational History    Not on file   Tobacco Use    Smoking status: Former     Packs/day: 1.00     Years: 30.00     Pack years: 30.00     Types: Cigarettes     Quit date: 2010     Years since quittin.8    Smokeless tobacco: Never   Substance and Sexual Activity    Alcohol use: Yes     Alcohol/week: 4.0 standard drinks     Types: 4 Cans of beer per week     Comment: socially    Drug use: No    Sexual activity: Not on file   Other Topics Concern    Not on file   Social History Narrative    Not on file     Social Determinants of Health     Financial Resource Strain: Not on file   Food Insecurity: Not on file   Transportation Needs: Not on file   Physical Activity: Not on file   Stress: Not on file   Social Connections: Not on file   Intimate Partner Violence: Not on file   Housing Stability: Not on file     Family History   Problem Relation Age of Onset    Cancer Mother 48        colon cancer    Cancer Father         bone cancer    Cancer Sister         colon cancer     Current Outpatient Medications   Medication Sig    furosemide (Lasix) 20 mg tablet Take 20 mg by mouth daily. amLODIPine (NORVASC) 5 mg tablet Take 5 mg by mouth daily. guaiFENesin-dextromethorphan SR (Mucinex DM) 600-30 mg per tablet Take 1 Tablet by mouth every twelve (12) hours as needed for Cough. azithromycin (ZITHROMAX) 250 mg tablet Take 1 Tablet by mouth See Admin Instructions for 5 days. atorvastatin (LIPITOR) 40 mg tablet Take 40 mg by mouth nightly. metoprolol succinate (TOPROL-XL) 25 mg XL tablet Take 50 mg by mouth nightly. aspirin 81 mg chewable tablet Take 81 mg by mouth daily. No current facility-administered medications for this visit.      Allergies   Allergen Reactions    Pcn [Penicillins] Hives     Pt reports it makes him sleepy and have hives     Immunization History Administered Date(s) Administered    Influenza, FLUAD, (age 72 y+), Adjuvanted 10/29/2020    Pneumococcal Polysaccharide (PPSV-23) 11/16/2020        Review of Systems   Constitutional:  Positive for fatigue. Negative for chills and fever. HENT:  Negative for congestion, sinus pressure, sinus pain and sore throat. Respiratory:  Positive for cough. Negative for chest tightness, shortness of breath and wheezing. Cardiovascular:  Negative for chest pain. Musculoskeletal:  Negative for myalgias. Psychiatric/Behavioral:  Negative for dysphoric mood. The patient is not nervous/anxious. /75 (BP 1 Location: Left upper arm, BP Patient Position: Sitting, BP Cuff Size: Adult)   Pulse 79   Temp 98.6 °F (37 °C) (Oral)   Resp 16   Ht 5' 9\" (1.753 m)   Wt 270 lb 12.8 oz (122.8 kg)   SpO2 96%   BMI 39.99 kg/m²    Physical Exam  Vitals and nursing note reviewed. Constitutional:       General: He is not in acute distress. Appearance: Normal appearance. HENT:      Head: Normocephalic and atraumatic. Right Ear: Tympanic membrane, ear canal and external ear normal.      Left Ear: Tympanic membrane, ear canal and external ear normal.      Nose: Nose normal.      Mouth/Throat:      Mouth: Mucous membranes are moist.      Pharynx: Oropharynx is clear. Cardiovascular:      Rate and Rhythm: Normal rate and regular rhythm. Pulmonary:      Effort: Pulmonary effort is normal.      Breath sounds: Normal breath sounds. No wheezing or rhonchi. Comments: Upper chest congestion  Musculoskeletal:      Cervical back: Normal range of motion and neck supple. Skin:     General: Skin is warm and dry. Neurological:      General: No focal deficit present. Mental Status: He is alert and oriented to person, place, and time. Psychiatric:         Mood and Affect: Mood normal.         Behavior: Behavior normal.             An electronic signature was used to authenticate this note.   -- Allan Ware Elin Zamora, NP

## 2022-11-08 ENCOUNTER — OFFICE VISIT (OUTPATIENT)
Dept: INTERNAL MEDICINE CLINIC | Age: 72
End: 2022-11-08
Payer: MEDICARE

## 2022-11-08 VITALS
TEMPERATURE: 97.8 F | DIASTOLIC BLOOD PRESSURE: 76 MMHG | BODY MASS INDEX: 40.49 KG/M2 | RESPIRATION RATE: 16 BRPM | OXYGEN SATURATION: 97 % | SYSTOLIC BLOOD PRESSURE: 130 MMHG | HEIGHT: 69 IN | HEART RATE: 84 BPM | WEIGHT: 273.4 LBS

## 2022-11-08 DIAGNOSIS — R97.20 ELEVATED PSA: ICD-10-CM

## 2022-11-08 DIAGNOSIS — I25.119 CORONARY ARTERY DISEASE INVOLVING NATIVE HEART WITH ANGINA PECTORIS, UNSPECIFIED VESSEL OR LESION TYPE (HCC): ICD-10-CM

## 2022-11-08 DIAGNOSIS — K21.9 GERD WITHOUT ESOPHAGITIS: ICD-10-CM

## 2022-11-08 DIAGNOSIS — F17.201 TOBACCO USE DISORDER, MODERATE, IN SUSTAINED REMISSION: ICD-10-CM

## 2022-11-08 DIAGNOSIS — N40.0 BENIGN PROSTATIC HYPERPLASIA WITHOUT LOWER URINARY TRACT SYMPTOMS: ICD-10-CM

## 2022-11-08 DIAGNOSIS — I10 ESSENTIAL HYPERTENSION: Primary | ICD-10-CM

## 2022-11-08 PROCEDURE — G8427 DOCREV CUR MEDS BY ELIG CLIN: HCPCS | Performed by: INTERNAL MEDICINE

## 2022-11-08 PROCEDURE — 99214 OFFICE O/P EST MOD 30 MIN: CPT | Performed by: INTERNAL MEDICINE

## 2022-11-08 PROCEDURE — G8432 DEP SCR NOT DOC, RNG: HCPCS | Performed by: INTERNAL MEDICINE

## 2022-11-08 PROCEDURE — 1101F PT FALLS ASSESS-DOCD LE1/YR: CPT | Performed by: INTERNAL MEDICINE

## 2022-11-08 PROCEDURE — G8417 CALC BMI ABV UP PARAM F/U: HCPCS | Performed by: INTERNAL MEDICINE

## 2022-11-08 PROCEDURE — G8536 NO DOC ELDER MAL SCRN: HCPCS | Performed by: INTERNAL MEDICINE

## 2022-11-08 PROCEDURE — 3017F COLORECTAL CA SCREEN DOC REV: CPT | Performed by: INTERNAL MEDICINE

## 2022-11-08 RX ORDER — PANTOPRAZOLE SODIUM 40 MG/1
40 TABLET, DELAYED RELEASE ORAL DAILY
COMMUNITY

## 2022-11-08 RX ORDER — METOPROLOL SUCCINATE 50 MG/1
50 TABLET, EXTENDED RELEASE ORAL DAILY
COMMUNITY

## 2022-11-08 NOTE — PROGRESS NOTES
HISTORY OF PRESENT ILLNESS  Marli Bates is a 80 y.o. male. HPI  He established with me in November of 2020, but I have not seen him since.  to my patient, Felicia Thompson. Since that time did see Dr. Katrina Torres, urologist, because of BPH with retention. Underwent a robotic simple prostatectomy May of 2021. Pathology was benign. Did have subsequent bleeding and was readmitted for cystoscopy and clot evacuation. Is also followed by Dr. Werner Killian for history of coronary disease. Denies any recent symptoms. Remains on medication for blood pressure, as well as Atorvastatin. Social History:  . Former smoker for 30 years. Occasional alcohol. . He comes in today really not having any specific concerns. Review of Systems   Constitutional:  Negative for chills, fever and weight loss. Respiratory:  Negative for cough, shortness of breath and wheezing. Cardiovascular:  Negative for chest pain, palpitations, orthopnea, leg swelling and PND. Gastrointestinal:  Negative for abdominal pain, heartburn and nausea. Genitourinary:  Negative for dysuria and hematuria. Musculoskeletal:  Positive for joint pain. Negative for falls and myalgias. Neurological:  Negative for dizziness and headaches. Physical Exam  Vitals and nursing note reviewed. Constitutional:       Appearance: He is well-developed. HENT:      Head: Normocephalic and atraumatic. Neck:      Thyroid: No thyromegaly. Vascular: No carotid bruit. Cardiovascular:      Rate and Rhythm: Normal rate and regular rhythm. Heart sounds: Normal heart sounds. Pulmonary:      Effort: Pulmonary effort is normal. No respiratory distress. Breath sounds: Normal breath sounds. No wheezing or rales. Musculoskeletal:      Cervical back: Normal range of motion and neck supple. Neurological:      Mental Status: He is alert and oriented to person, place, and time.    Psychiatric:         Behavior: Behavior normal.       ASSESSMENT and PLAN  Diagnoses and all orders for this visit:    1. Essential hypertension    2. Coronary artery disease involving native heart with angina pectoris, unspecified vessel or lesion type ([facility] [address]  Cont with dr rocha  3. Elevated PSA-sp prostatectomy with dr Marline Zavala urology follows him surgery done for bph with retention    4. Benign prostatic hyperplasia without lower urinary tract symptoms    5. Gerd-stable    6.  Tobacco use disorder, moderate, in sustained remission  Would be a candidate for lung cancer screening  Discuss at wellness visit advised to schedule in the spring

## 2023-04-04 ENCOUNTER — OFFICE VISIT (OUTPATIENT)
Dept: INTERNAL MEDICINE CLINIC | Age: 73
End: 2023-04-04
Payer: MEDICARE

## 2023-04-04 PROCEDURE — G8536 NO DOC ELDER MAL SCRN: HCPCS | Performed by: INTERNAL MEDICINE

## 2023-04-04 PROCEDURE — 99213 OFFICE O/P EST LOW 20 MIN: CPT | Performed by: INTERNAL MEDICINE

## 2023-04-04 PROCEDURE — 1101F PT FALLS ASSESS-DOCD LE1/YR: CPT | Performed by: INTERNAL MEDICINE

## 2023-04-04 PROCEDURE — G8427 DOCREV CUR MEDS BY ELIG CLIN: HCPCS | Performed by: INTERNAL MEDICINE

## 2023-04-04 PROCEDURE — 3017F COLORECTAL CA SCREEN DOC REV: CPT | Performed by: INTERNAL MEDICINE

## 2023-04-04 PROCEDURE — G8417 CALC BMI ABV UP PARAM F/U: HCPCS | Performed by: INTERNAL MEDICINE

## 2023-04-04 PROCEDURE — G8510 SCR DEP NEG, NO PLAN REQD: HCPCS | Performed by: INTERNAL MEDICINE

## 2023-04-04 RX ORDER — CLOTRIMAZOLE AND BETAMETHASONE DIPROPIONATE 10; .64 MG/G; MG/G
CREAM TOPICAL 2 TIMES DAILY
Qty: 30 G | Refills: 0 | Status: SHIPPED
Start: 2023-04-04 | End: 2023-04-18

## 2023-04-04 NOTE — PROGRESS NOTES
HISTORY OF PRESENT ILLNESS    Chief Complaint   Patient presents with    Skin Problem     Spot on back of both calves - itch - redness - 1 month     Patient of Dr. Zamzam Flor Presents with a rash on his calves. Left > right calf. Onset was about 1 month ago. It is itchy, red and there is a significant round kasia on the left calf. He does have dogs and is active. Denies any exposures which may have precipitated this. No past history of eczema. Review of Systems   All other systems reviewed and are negative, except as noted in HPI    Past Medical and Surgical History   has a past medical history of BPH (benign prostatic hyperplasia) (11/8/2022), CAD (coronary artery disease), Elevated PSA (11/16/2020), FH: colon cancer (11/16/2020), Other ill-defined conditions(799.89), Other ill-defined conditions(799.89), and Other ill-defined conditions(799.89). has a past surgical history that includes hx orthopaedic; pr unlisted procedure cardiac surgery; and hx shoulder replacement (Right). reports that he quit smoking about 12 years ago. His smoking use included cigarettes. He has a 30.00 pack-year smoking history. He has never used smokeless tobacco. He reports current alcohol use of about 4.0 standard drinks per week. He reports that he does not use drugs. family history includes Cancer in his father and sister; Cancer (age of onset: 48) in his mother. Physical Exam   Nursing note and vitals reviewed. Blood pressure (!) 146/77, pulse 72, temperature 98.4 °F (36.9 °C), temperature source Oral, resp. rate 16, height 5' 9\" (1.753 m), weight 278 lb 3.2 oz (126.2 kg), SpO2 94 %. Constitutional:  No distress. Eyes: Conjunctivae are normal.   Ears:  Hearing grossly intact  Cardiovascular: Normal rate. regular rhythm, no murmurs or gallops  No edema  Pulmonary/Chest: Effort normal.   CTAB  Musculoskeletal: moves all 4 extremities   Neurological: Alert and oriented to person, place, and time.    Skin:   Left calf with serpiginous circular erythematous rash about 3 cm in diameter. Right calf with a smaller region, not circular. Psychiatric: Normal mood and affect. Behavior is normal.     Assessment and Plan    Diagnoses and all orders for this visit:    1. Ringworm  Possibly from exposure to dog? Trial of Lotrisone. Can separate prescriptions into separate tubes if needed based on insurance coverage. Reassured. He has follow-up in 2 weeks with PCP for reevaluation and wellness exam.  -     clotrimazole-betamethasone (LOTRISONE) topical cream; Apply  to affected area two (2) times a day for 14 days. 2. Coronary artery disease involving native heart with angina pectoris, unspecified vessel or lesion type [facility])  This condition appears to be stable and is being evaluated and managed by his specialist cardiology Dr. Georgette Hay. No acute findings today warrant change in management plan. 3. Obesity, Class III, BMI 40-49.9 (morbid obesity) ([facility] [address]  The patient is asked to make an attempt to improve diet and exercise patterns to aid in medical management of this problem. There are no Patient Instructions on file for this visit.    lab results and schedule of future lab studies reviewed with patient  reviewed medications and side effects in detail    Return to clinic for further evaluation if new symptoms develop        Current Outpatient Medications   Medication Sig    clotrimazole-betamethasone (LOTRISONE) topical cream Apply  to affected area two (2) times a day for 14 days. pantoprazole (PROTONIX) 40 mg tablet Take 40 mg by mouth daily. metoprolol succinate (TOPROL-XL) 50 mg XL tablet Take 50 mg by mouth daily. furosemide (LASIX) 20 mg tablet Take 20 mg by mouth daily. amLODIPine (NORVASC) 5 mg tablet Take 5 mg by mouth daily. atorvastatin (LIPITOR) 40 mg tablet Take 20 mg by mouth nightly. aspirin 81 mg chewable tablet Take 81 mg by mouth daily.      No current facility-administered medications for this visit.

## 2023-04-20 ENCOUNTER — OFFICE VISIT (OUTPATIENT)
Dept: INTERNAL MEDICINE CLINIC | Age: 73
End: 2023-04-20

## 2023-04-20 VITALS
DIASTOLIC BLOOD PRESSURE: 68 MMHG | HEART RATE: 72 BPM | SYSTOLIC BLOOD PRESSURE: 140 MMHG | OXYGEN SATURATION: 95 % | RESPIRATION RATE: 16 BRPM | BODY MASS INDEX: 41.23 KG/M2 | HEIGHT: 69 IN | WEIGHT: 278.4 LBS | TEMPERATURE: 97.8 F

## 2023-04-20 DIAGNOSIS — Z00.00 MEDICARE ANNUAL WELLNESS VISIT, SUBSEQUENT: Primary | ICD-10-CM

## 2023-04-20 DIAGNOSIS — F17.201 TOBACCO USE DISORDER, MODERATE, IN SUSTAINED REMISSION: ICD-10-CM

## 2023-04-20 DIAGNOSIS — N40.0 BENIGN PROSTATIC HYPERPLASIA WITHOUT LOWER URINARY TRACT SYMPTOMS: ICD-10-CM

## 2023-04-20 DIAGNOSIS — I10 ESSENTIAL HYPERTENSION: ICD-10-CM

## 2023-04-20 DIAGNOSIS — I25.119 CORONARY ARTERY DISEASE INVOLVING NATIVE CORONARY ARTERY OF NATIVE HEART WITH ANGINA PECTORIS (HCC): ICD-10-CM

## 2023-04-20 DIAGNOSIS — Z23 ENCOUNTER FOR IMMUNIZATION: ICD-10-CM

## 2023-04-20 DIAGNOSIS — R97.20 ELEVATED PSA: ICD-10-CM

## 2023-04-20 NOTE — PROGRESS NOTES
HISTORY OF PRESENT ILLNESS  Blanca Rodriguez is a 71 y.o. male. [facility]  For Medicare wellness visit and follow-up on several issues #1 GERD controlled on pantoprazole 40 mg no breakthrough symptoms or bleeding #2 hypertension on Toprol 50 and amlodipine 5 mg no headaches or dizzy spells sees Dr. Hilda Connell from cardiology and denies recent anginal symptoms or changes in breathing #3 enlarged prostate status post prostatectomy followed by Dr. Farnsworth Current notes currently no trouble with urination and no UTI symptoms #4 history of tobacco use heavy but none since age 62 declines lung cancer screening protocol with low-dose lung CT. #5 family history of colon cancer reports had a colonoscopy and does not want to discuss further screening currently. 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 Review of Systems   Constitutional:  Negative for chills, fever and weight loss. HENT:  Negative for hearing loss. Respiratory:  Negative for cough, shortness of breath and wheezing. Cardiovascular:  Negative for chest pain, palpitations, orthopnea, leg swelling and PND. Gastrointestinal:  Negative for abdominal pain, constipation, heartburn, nausea and vomiting. Genitourinary:  Negative for dysuria. Musculoskeletal:  Negative for falls and myalgias. Neurological:  Negative for dizziness and headaches. Psychiatric/Behavioral:  Negative for depression and memory loss. Physical Exam  Vitals and nursing note reviewed. Constitutional:       Appearance: He is well-developed. HENT:      Head: Normocephalic and atraumatic. Right Ear: Tympanic membrane normal.      Left Ear: Tympanic membrane normal.   Neck:      Thyroid: No thyromegaly. Vascular: No carotid bruit. Cardiovascular:      Rate and Rhythm: Normal rate and regular rhythm.       Heart sounds: Normal heart sounds. Pulmonary:      Effort: Pulmonary effort is normal. No respiratory distress. Breath sounds: Normal breath sounds. No wheezing or rales. Abdominal:      General: Bowel sounds are normal. There is distension. Palpations: Abdomen is soft. There is no mass. Tenderness: There is no abdominal tenderness. There is no guarding or rebound. Musculoskeletal:      Cervical back: Normal range of motion and neck supple. Right lower leg: No edema. Neurological:      Mental Status: He is alert and oriented to person, place, and time. Psychiatric:         Behavior: Behavior normal.       ASSESSMENT and PLAN  Diagnoses and all orders for this visit:    1. Medicare annual wellness visit, subsequent    2. Essential hypertension-cont meds  Advised dec etoh use  Increase exercise      3. Elevated PSA-sees urology dr Freya Smith     4. Coronary artery disease involving native coronary artery of native heart without angina-cont meds see dr Dalia Phillip exercise  -     [address] [place] [address]; Future  -     LIPID PANEL; Future  -     METABOLIC PANEL, COMPREHENSIVE; Future  -     CBC WITH AUTOMATED DIFF; Future    5. Tobacco use disorder, moderate, in sustained remission-declines lung ct screening    6. Benign prostatic hyperplasia without lower urinary tract symptoms  -     CBC WITH AUTOMATED DIFF; Future    7. Encounter for immunization  -     TDAP, [address] [facility] [address], (AGE 10 YRS+), IM    This is the Subsequent Medicare Annual Wellness Exam, performed 12 months or more after the Initial AWV or the last Subsequent AWV    I have reviewed the patient's medical history in detail and updated the computerized patient record. Assessment/Plan   Education and counseling provided:  Are appropriate based on today's review and evaluation  Influenza Vaccine  Prostate cancer screening tests (PSA, covered annually)  Colorectal cancer screening tests  Screening for glaucoma    1.  Medicare annual wellness visit, subsequent  2. Essential hypertension  3. Elevated PSA  4. Coronary artery disease involving native coronary artery of native heart with angina pectoris (HCC)  -     TSH 3RD GENERATION; Future  -     LIPID PANEL; Future  -     METABOLIC PANEL, COMPREHENSIVE; Future  -     CBC WITH AUTOMATED DIFF; Future  5. Tobacco use disorder, moderate, in sustained remission  6. Benign prostatic hyperplasia without lower urinary tract symptoms  -     CBC WITH AUTOMATED DIFF; Future  7. Encounter for immunization  -     Liane Schmidt, (AGE 10 YRS+),        Depression Risk Factor Screening     3 most recent PHQ Screens 4/20/2023   Little interest or pleasure in doing things Not at all   Feeling down, depressed, irritable, or hopeless Not at all   Total Score PHQ 2 0       Alcohol & Drug Abuse Risk Screen   Do you average more than 1 drink per night or more than 7 drinks a week?: (P) Yes  In the past three months have you had more than 4 drinks containing alcohol on one occasion?: (P) Yes          Functional Ability and Level of Safety   Hearing:  Hearing: (P) Patient wears hearing aid    Activities of Daily Living: The home contains: (P) rugs  Functional ADLs: (P) Patient does total self care   Ambulation:  Patient ambulates: (P) with no difficulty   Fall Risk:  Fall Risk Assessment, last 12 mths 4/20/2023   Able to walk? Yes   Fall in past 12 months? 0   Do you feel unsteady?  0   Are you worried about falling 0     Abuse Screen:  Do you ever feel afraid of your partner?: (P) No  Are you in a relationship with someone who physically or mentally threatens you?: (P) No  Is it safe for you to go home?: (P) Yes      Cognitive Screening   Has your family/caregiver stated any concerns about your memory?: (P) No   Cognitive Screening: Normal - Verbal Fluency Test    Health Maintenance Due     Health Maintenance Due   Topic Date Due    Shingles Vaccine (1 of 2) Never done    AAA Screening 73-67 YO Male Smoking Patients  Never done    Lipid Screen  04/02/2020    Medicare Yearly Exam  Never done    Colorectal Cancer Screening Combo  07/05/2022       Patient Care Team   Patient Care Team:  Mj Murrieta MD as PCP - General (Internal Medicine Physician)  Mj Murrieta MD as PCP - [facility]    History     Patient Active Problem List   Diagnosis Code    Unstable angina ([facility] [address] I20.0    Elevated PSA R97.20    FH: colon cancer Z80.0    Chest pain, rule out acute myocardial infarction R07.9    Coronary artery disease involving native coronary artery of native heart with angina pectoris ([facility] [address] I25.119    BPH (benign prostatic hyperplasia) N40.0    Tobacco use disorder, moderate, in sustained remission F17.201     Past Medical History:   Diagnosis Date    BPH (benign prostatic hyperplasia) 11/8/2022 5/11/21 dr Jessie Acuna did a robotic lap  Simple prostatectomy benign tissue but had issues with retention    CAD (coronary artery disease)     3 stents    Elevated PSA 11/16/2020    Dr Aparna Doe biopsy neg    FH: colon cancer 11/16/2020    Other ill-defined conditions(799.89)     bph    Other ill-defined conditions(799.89)     hypercholesterolemia    Other ill-defined conditions(799.89)     accidental finger amputation      Past Surgical History:   Procedure Laterality Date    HX ORTHOPAEDIC      l sholder surg    HX SHOULDER REPLACEMENT Right     IN UNLISTED PROCEDURE CARDIAC SURGERY      3 stents     Current Outpatient Medications   Medication Sig Dispense Refill    pantoprazole (PROTONIX) 40 mg tablet Take 1 Tablet by mouth daily. metoprolol succinate (TOPROL-XL) 50 mg XL tablet Take 1 Tablet by mouth daily. furosemide (LASIX) 20 mg tablet Take 1 Tablet by mouth daily. amLODIPine (NORVASC) 5 mg tablet Take 1 Tablet by mouth daily. atorvastatin (LIPITOR) 40 mg tablet Take 0.5 Tablets by mouth nightly. aspirin 81 mg chewable tablet Take 1 Tablet by mouth daily.        Allergies   Allergen Reactions Pcn [Penicillins] Hives     Pt reports it makes him sleepy and have hives       Family History   Problem Relation Age of Onset    Cancer Mother 48        colon cancer    Cancer Father         bone cancer    Cancer Sister         colon cancer     Social History     Tobacco Use    Smoking status: Former     Packs/day: 1.00     Years: 30.00     Pack years: 30.00     Types: Cigarettes     Quit date: 2010     Years since quittin.4    Smokeless tobacco: Never   Substance Use Topics    Alcohol use:  Yes     Alcohol/week: 4.0 standard drinks     Types: 4 Cans of beer per week     Comment: phillip Pizano MD

## 2023-04-20 NOTE — PATIENT INSTRUCTIONS
Medicare Wellness Visit, Male    The best way to live healthy is to have a lifestyle where you eat a well-balanced diet, exercise regularly, limit alcohol use, and quit all forms of tobacco/nicotine, if applicable. Regular preventive services are another way to keep healthy. Preventive services (vaccines, screening tests, monitoring & exams) can help personalize your care plan, which helps you manage your own care. Screening tests can find health problems at the earliest stages, when they are easiest to treat. [facility] follows the current, evidence-based guidelines published by the [facility] ([facility]) when recommending preventive services for our patients. Because we follow these guidelines, sometimes recommendations change over time as research supports it. (For example, a prostate screening blood test is no longer routinely recommended for men with no symptoms). Of course, you and your doctor may decide to screen more often for some diseases, based on your risk and co-morbidities (chronic disease you are already diagnosed with). Preventive services for you include:  - Medicare offers their members a free annual wellness visit, which is time for you and your primary care provider to discuss and plan for your preventive service needs.  Take advantage of this benefit every year!    -Over the age of 72 should receive the recommended pneumonia vaccines.   -All adults should have a flu vaccine yearly.  -All adults should receive a tetanus vaccine every 10 years.  -Over the age of 48 should receive the shingles vaccines.    -All adults should be screened once for Hepatitis C.  -All adults age 38-68 who are overweight should have a diabetes screening test once every three years.   -Other screening tests & preventive services for persons with diabetes include: an eye exam to screen for diabetic retinopathy, a kidney function test, a foot exam, and stricter control over your cholesterol.   -Cardiovascular screening for adults with routine risk involves an electrocardiogram (ECG) at intervals determined by the provider.     -Colorectal cancer screening should be done for adults age 43-69 with no increased risk factors for colorectal cancer. There are a number of acceptable methods of screening for this type of cancer. Each test has its own benefits and drawbacks. Discuss with your provider what is most appropriate for you during your annual wellness visit. The different tests include: colonoscopy (considered the best screening method), a fecal occult blood test, a fecal DNA test, and sigmoidoscopy.    -Lung cancer screening is recommended annually with a low dose CT scan for adults between age 54 and 68, who have smoked at least 30 pack years (equivalent of 1 pack per day for 30 days), and who is a current smoker or quit less than 15 years ago. -An Abdominal Aortic Aneurysm (AAA) Screening is recommended for men age 73-68 who has ever smoked in their lifetime.      Here is a list of your current Health Maintenance items (your personalized list of preventive services) with a due date:  Health Maintenance Due   Topic Date Due    Shingles Vaccine (1 of 2) Never done    AAA Screening  Never done    Cholesterol Test   04/02/2020    Colorectal Screening  07/05/2022

## 2023-04-21 LAB
ALBUMIN SERPL-MCNC: 3.5 G/DL (ref 3.5–5)
ALBUMIN/GLOB SERPL: 1 (ref 1.1–2.2)
ALP SERPL-CCNC: 69 U/L (ref 45–117)
ALT SERPL-CCNC: 64 U/L (ref 12–78)
ANION GAP SERPL CALC-SCNC: 4 MMOL/L (ref 5–15)
AST SERPL-CCNC: 48 U/L (ref 15–37)
BASOPHILS # BLD: 0.1 K/UL (ref 0–0.1)
BASOPHILS NFR BLD: 1 % (ref 0–1)
BILIRUB SERPL-MCNC: 0.5 MG/DL (ref 0.2–1)
BUN SERPL-MCNC: 14 MG/DL (ref 6–20)
BUN/CREAT SERPL: 15 (ref 12–20)
CALCIUM SERPL-MCNC: 8.7 MG/DL (ref 8.5–10.1)
CHLORIDE SERPL-SCNC: 105 MMOL/L (ref 97–108)
CHOLEST SERPL-MCNC: 101 MG/DL
CO2 SERPL-SCNC: 30 MMOL/L (ref 21–32)
CREAT SERPL-MCNC: 0.94 MG/DL (ref 0.7–1.3)
DIFFERENTIAL METHOD BLD: ABNORMAL
EOSINOPHIL # BLD: 0.6 K/UL (ref 0–0.4)
EOSINOPHIL NFR BLD: 8 % (ref 0–7)
ERYTHROCYTE [DISTWIDTH] IN BLOOD BY AUTOMATED COUNT: 13 % (ref 11.5–14.5)
GLOBULIN SER CALC-MCNC: 3.4 G/DL (ref 2–4)
GLUCOSE SERPL-MCNC: 114 MG/DL (ref 65–100)
HCT VFR BLD AUTO: 38.1 % (ref 36.6–50.3)
HDLC SERPL-MCNC: 46 MG/DL
HDLC SERPL: 2.2 (ref 0–5)
HGB BLD-MCNC: 12.4 G/DL (ref 12.1–17)
IMM GRANULOCYTES # BLD AUTO: 0.1 K/UL (ref 0–0.04)
IMM GRANULOCYTES NFR BLD AUTO: 1 % (ref 0–0.5)
LDLC SERPL CALC-MCNC: 33 MG/DL (ref 0–100)
LYMPHOCYTES # BLD: 1.4 K/UL (ref 0.8–3.5)
LYMPHOCYTES NFR BLD: 18 % (ref 12–49)
MCH RBC QN AUTO: 37.5 PG (ref 26–34)
MCHC RBC AUTO-ENTMCNC: 32.5 G/DL (ref 30–36.5)
MCV RBC AUTO: 115.1 FL (ref 80–99)
MONOCYTES # BLD: 1.3 K/UL (ref 0–1)
MONOCYTES NFR BLD: 16 % (ref 5–13)
NEUTS SEG # BLD: 4.5 K/UL (ref 1.8–8)
NEUTS SEG NFR BLD: 56 % (ref 32–75)
NRBC # BLD: 0.02 K/UL (ref 0–0.01)
NRBC BLD-RTO: 0.2 PER 100 WBC
PLATELET # BLD AUTO: 235 K/UL (ref 150–400)
PMV BLD AUTO: 11.9 FL (ref 8.9–12.9)
POTASSIUM SERPL-SCNC: 4.3 MMOL/L (ref 3.5–5.1)
PROT SERPL-MCNC: 6.9 G/DL (ref 6.4–8.2)
RBC # BLD AUTO: 3.31 M/UL (ref 4.1–5.7)
RBC MORPH BLD: ABNORMAL
SODIUM SERPL-SCNC: 139 MMOL/L (ref 136–145)
TRIGL SERPL-MCNC: 110 MG/DL (ref ?–150)
TSH SERPL DL<=0.05 MIU/L-ACNC: 1.36 UIU/ML (ref 0.36–3.74)
VLDLC SERPL CALC-MCNC: 22 MG/DL
WBC # BLD AUTO: 8 K/UL (ref 4.1–11.1)

## 2023-04-25 ENCOUNTER — TELEPHONE (OUTPATIENT)
Dept: INTERNAL MEDICINE CLINIC | Age: 73
End: 2023-04-25

## 2023-04-25 NOTE — TELEPHONE ENCOUNTER
I called to discuss his labs  Glucose mildly elevated advised watch sweets and starches  Cbc abnormal differential and abnormal mcv-increased over time  I do advise hematology appt and he will schedule this in the next 2 months and let  us know when it is scheduled  See me in the fall  Reviewed other labs are good

## 2023-04-26 ENCOUNTER — TELEPHONE (OUTPATIENT)
Dept: ONCOLOGY | Age: 73
End: 2023-04-26

## 2023-07-20 ENCOUNTER — OFFICE VISIT (OUTPATIENT)
Age: 73
End: 2023-07-20
Payer: MEDICARE

## 2023-07-20 VITALS
TEMPERATURE: 97.2 F | OXYGEN SATURATION: 100 % | SYSTOLIC BLOOD PRESSURE: 150 MMHG | RESPIRATION RATE: 18 BRPM | WEIGHT: 278 LBS | BODY MASS INDEX: 41.18 KG/M2 | HEIGHT: 69 IN | DIASTOLIC BLOOD PRESSURE: 86 MMHG | HEART RATE: 82 BPM

## 2023-07-20 DIAGNOSIS — D75.89 MACROCYTOSIS WITHOUT ANEMIA: ICD-10-CM

## 2023-07-20 DIAGNOSIS — I25.10 CORONARY ARTERY DISEASE INVOLVING NATIVE CORONARY ARTERY OF NATIVE HEART WITHOUT ANGINA PECTORIS: ICD-10-CM

## 2023-07-20 DIAGNOSIS — D72.821 MONOCYTOSIS: ICD-10-CM

## 2023-07-20 DIAGNOSIS — E78.5 HYPERLIPIDEMIA, UNSPECIFIED HYPERLIPIDEMIA TYPE: ICD-10-CM

## 2023-07-20 DIAGNOSIS — D72.821 MONOCYTOSIS: Primary | ICD-10-CM

## 2023-07-20 PROCEDURE — 99204 OFFICE O/P NEW MOD 45 MIN: CPT | Performed by: INTERNAL MEDICINE

## 2023-07-20 PROCEDURE — 3017F COLORECTAL CA SCREEN DOC REV: CPT | Performed by: INTERNAL MEDICINE

## 2023-07-20 PROCEDURE — G8427 DOCREV CUR MEDS BY ELIG CLIN: HCPCS | Performed by: INTERNAL MEDICINE

## 2023-07-20 PROCEDURE — 1036F TOBACCO NON-USER: CPT | Performed by: INTERNAL MEDICINE

## 2023-07-20 PROCEDURE — 1123F ACP DISCUSS/DSCN MKR DOCD: CPT | Performed by: INTERNAL MEDICINE

## 2023-07-20 PROCEDURE — G8417 CALC BMI ABV UP PARAM F/U: HCPCS | Performed by: INTERNAL MEDICINE

## 2023-07-20 ASSESSMENT — PATIENT HEALTH QUESTIONNAIRE - PHQ9
SUM OF ALL RESPONSES TO PHQ QUESTIONS 1-9: 0
2. FEELING DOWN, DEPRESSED OR HOPELESS: 0
SUM OF ALL RESPONSES TO PHQ QUESTIONS 1-9: 0
SUM OF ALL RESPONSES TO PHQ QUESTIONS 1-9: 0
SUM OF ALL RESPONSES TO PHQ9 QUESTIONS 1 & 2: 0
1. LITTLE INTEREST OR PLEASURE IN DOING THINGS: 0
SUM OF ALL RESPONSES TO PHQ QUESTIONS 1-9: 0

## 2023-07-21 LAB
BASOPHILS # BLD: 0.1 K/UL (ref 0–0.1)
BASOPHILS NFR BLD: 1 % (ref 0–1)
DIFFERENTIAL METHOD BLD: ABNORMAL
EOSINOPHIL # BLD: 0.7 K/UL (ref 0–0.4)
EOSINOPHIL NFR BLD: 8 % (ref 0–7)
ERYTHROCYTE [DISTWIDTH] IN BLOOD BY AUTOMATED COUNT: 12.3 % (ref 11.5–14.5)
FOLATE SERPL-MCNC: 24.7 NG/ML (ref 5–21)
HCT VFR BLD AUTO: 40.2 % (ref 36.6–50.3)
HGB BLD-MCNC: 13.4 G/DL (ref 12.1–17)
IMM GRANULOCYTES # BLD AUTO: 0 K/UL (ref 0–0.04)
IMM GRANULOCYTES NFR BLD AUTO: 0 % (ref 0–0.5)
LDH SERPL L TO P-CCNC: 178 U/L (ref 85–241)
LYMPHOCYTES # BLD: 1.3 K/UL (ref 0.8–3.5)
LYMPHOCYTES NFR BLD: 15 % (ref 12–49)
MCH RBC QN AUTO: 37.9 PG (ref 26–34)
MCHC RBC AUTO-ENTMCNC: 33.3 G/DL (ref 30–36.5)
MCV RBC AUTO: 113.6 FL (ref 80–99)
MONOCYTES # BLD: 1.2 K/UL (ref 0–1)
MONOCYTES NFR BLD: 14 % (ref 5–13)
NEUTS SEG # BLD: 5.4 K/UL (ref 1.8–8)
NEUTS SEG NFR BLD: 62 % (ref 32–75)
NRBC # BLD: 0.03 K/UL (ref 0–0.01)
NRBC BLD-RTO: 0.3 PER 100 WBC
PLATELET # BLD AUTO: 215 K/UL (ref 150–400)
PMV BLD AUTO: 12 FL (ref 8.9–12.9)
RBC # BLD AUTO: 3.54 M/UL (ref 4.1–5.7)
RBC MORPH BLD: ABNORMAL
VIT B12 SERPL-MCNC: 677 PG/ML (ref 193–986)
WBC # BLD AUTO: 8.7 K/UL (ref 4.1–11.1)

## 2023-07-24 LAB — PERIPHERAL SMEAR, MD REVIEW: NORMAL

## 2023-12-12 ENCOUNTER — OFFICE VISIT (OUTPATIENT)
Age: 73
End: 2023-12-12
Payer: MEDICARE

## 2023-12-12 VITALS
BODY MASS INDEX: 39.99 KG/M2 | DIASTOLIC BLOOD PRESSURE: 83 MMHG | HEART RATE: 88 BPM | TEMPERATURE: 97.5 F | SYSTOLIC BLOOD PRESSURE: 157 MMHG | RESPIRATION RATE: 16 BRPM | HEIGHT: 69 IN | OXYGEN SATURATION: 95 % | WEIGHT: 270 LBS

## 2023-12-12 DIAGNOSIS — I10 HTN, GOAL BELOW 130/80: ICD-10-CM

## 2023-12-12 DIAGNOSIS — R60.0 EDEMA LEG: ICD-10-CM

## 2023-12-12 DIAGNOSIS — M25.571 ACUTE RIGHT ANKLE PAIN: Primary | ICD-10-CM

## 2023-12-12 PROCEDURE — 3079F DIAST BP 80-89 MM HG: CPT | Performed by: INTERNAL MEDICINE

## 2023-12-12 PROCEDURE — 1036F TOBACCO NON-USER: CPT | Performed by: INTERNAL MEDICINE

## 2023-12-12 PROCEDURE — G8417 CALC BMI ABV UP PARAM F/U: HCPCS | Performed by: INTERNAL MEDICINE

## 2023-12-12 PROCEDURE — 3077F SYST BP >= 140 MM HG: CPT | Performed by: INTERNAL MEDICINE

## 2023-12-12 PROCEDURE — G8484 FLU IMMUNIZE NO ADMIN: HCPCS | Performed by: INTERNAL MEDICINE

## 2023-12-12 PROCEDURE — 3017F COLORECTAL CA SCREEN DOC REV: CPT | Performed by: INTERNAL MEDICINE

## 2023-12-12 PROCEDURE — G8427 DOCREV CUR MEDS BY ELIG CLIN: HCPCS | Performed by: INTERNAL MEDICINE

## 2023-12-12 PROCEDURE — 99214 OFFICE O/P EST MOD 30 MIN: CPT | Performed by: INTERNAL MEDICINE

## 2023-12-12 PROCEDURE — 1123F ACP DISCUSS/DSCN MKR DOCD: CPT | Performed by: INTERNAL MEDICINE

## 2023-12-12 NOTE — PROGRESS NOTES
Rigoberto Dueñas (:  1950) is a 68 y.o. male,Established patient, here for evaluation of the following chief complaint(s):  Leg Pain (Patient here due to pain in right leg/ankle, pt states it is no longer bothering him but when he called to make an appt it felt like stabbing pain. )         ASSESSMENT/PLAN:  1. Acute right ankle pain-now better with nsaid  Ro clot given swelling  Cont lasix and rto if pain recurs  -     Vascular duplex lower extremity venous right; Future  2. HTN, goal below 130/80  3. Edema leg  -     Vascular duplex lower extremity venous right; Future      No follow-ups on file. Subjective   SUBJECTIVE/OBJECTIVE:  Leg Pain       On  he woke with very sharp pain in right ankle and along the lateral aspect of right calf. Did not see redness or unusual swelling had not had any injuries although notes he might have slid on acorns in his driveway. Went to urgent care reports that they did x-rays but not ultrasound. Discharged him with a Sterapred DS 6-day pack as well as oxycodone neither of which helped. He then started using ibuprofen 200 mg every 6 hours for about 5 days and notes that his pain is all resolved now. Does still have significant swelling of both legs right worse than left. Reports that this is better with first thing in the morning and that the furosemide helps. Denies any chest pain or shortness of breath. Has seen Dr. Milagro Bridges in the summer and is felt to possibly have an underlying bone marrow disorder. Has not had any recent known clotting. Review of Systems       Objective   Physical Exam  Constitutional:       Appearance: Normal appearance. HENT:      Head: Normocephalic and atraumatic. Cardiovascular:      Rate and Rhythm: Normal rate and regular rhythm. Pulmonary:      Effort: Pulmonary effort is normal.      Breath sounds: Normal breath sounds. No wheezing or rhonchi. Musculoskeletal:      Right lower leg: Edema present.

## 2024-03-22 ENCOUNTER — APPOINTMENT (OUTPATIENT)
Facility: HOSPITAL | Age: 74
End: 2024-03-22
Payer: MEDICARE

## 2024-03-22 ENCOUNTER — HOSPITAL ENCOUNTER (EMERGENCY)
Facility: HOSPITAL | Age: 74
Discharge: HOME | End: 2024-03-22
Attending: EMERGENCY MEDICINE
Payer: MEDICARE

## 2024-03-22 VITALS
RESPIRATION RATE: 14 BRPM | HEART RATE: 92 BPM | BODY MASS INDEX: 41.57 KG/M2 | SYSTOLIC BLOOD PRESSURE: 162 MMHG | DIASTOLIC BLOOD PRESSURE: 84 MMHG | WEIGHT: 281.53 LBS | OXYGEN SATURATION: 94 % | TEMPERATURE: 99.1 F

## 2024-03-22 DIAGNOSIS — S02.2XXA CLOSED FRACTURE OF NASAL BONE, INITIAL ENCOUNTER: ICD-10-CM

## 2024-03-22 DIAGNOSIS — S09.90XA INJURY OF HEAD, INITIAL ENCOUNTER: Primary | ICD-10-CM

## 2024-03-22 LAB
ALBUMIN SERPL-MCNC: 3.7 G/DL (ref 3.5–5)
ALBUMIN/GLOB SERPL: 0.9 (ref 1.1–2.2)
ALP SERPL-CCNC: 99 U/L (ref 45–117)
ALT SERPL-CCNC: 72 U/L (ref 12–78)
ANION GAP SERPL CALC-SCNC: 9 MMOL/L (ref 5–15)
AST SERPL-CCNC: 62 U/L (ref 15–37)
BASOPHILS # BLD: 0 K/UL (ref 0–0.1)
BASOPHILS NFR BLD: 0 % (ref 0–1)
BILIRUB SERPL-MCNC: 0.8 MG/DL (ref 0.2–1)
BUN SERPL-MCNC: 17 MG/DL (ref 6–20)
BUN/CREAT SERPL: 19 (ref 12–20)
CALCIUM SERPL-MCNC: 9 MG/DL (ref 8.5–10.1)
CHLORIDE SERPL-SCNC: 103 MMOL/L (ref 97–108)
CO2 SERPL-SCNC: 29 MMOL/L (ref 21–32)
CREAT SERPL-MCNC: 0.89 MG/DL (ref 0.7–1.3)
DIFFERENTIAL METHOD BLD: ABNORMAL
EOSINOPHIL # BLD: 0.5 K/UL (ref 0–0.4)
EOSINOPHIL NFR BLD: 5 % (ref 0–7)
ERYTHROCYTE [DISTWIDTH] IN BLOOD BY AUTOMATED COUNT: 11.9 % (ref 11.5–14.5)
ETHANOL SERPL-MCNC: <10 MG/DL (ref 0–0.08)
GLOBULIN SER CALC-MCNC: 3.9 G/DL (ref 2–4)
GLUCOSE SERPL-MCNC: 94 MG/DL (ref 65–100)
HCT VFR BLD AUTO: 40.6 % (ref 36.6–50.3)
HGB BLD-MCNC: 14 G/DL (ref 12.1–17)
IMM GRANULOCYTES # BLD AUTO: 0 K/UL (ref 0–0.04)
IMM GRANULOCYTES NFR BLD AUTO: 0 % (ref 0–0.5)
INR PPP: 1 (ref 0.9–1.1)
LYMPHOCYTES # BLD: 1.8 K/UL (ref 0.8–3.5)
LYMPHOCYTES NFR BLD: 17 % (ref 12–49)
MCH RBC QN AUTO: 37.8 PG (ref 26–34)
MCHC RBC AUTO-ENTMCNC: 34.5 G/DL (ref 30–36.5)
MCV RBC AUTO: 109.7 FL (ref 80–99)
MONOCYTES # BLD: 1.3 K/UL (ref 0–1)
MONOCYTES NFR BLD: 12 % (ref 5–13)
NEUTS SEG # BLD: 6.9 K/UL (ref 1.8–8)
NEUTS SEG NFR BLD: 66 % (ref 32–75)
NRBC # BLD: 0 K/UL (ref 0–0.01)
NRBC BLD-RTO: 0 PER 100 WBC
PLATELET # BLD AUTO: 199 K/UL (ref 150–400)
PMV BLD AUTO: 10.8 FL (ref 8.9–12.9)
POTASSIUM SERPL-SCNC: 4.1 MMOL/L (ref 3.5–5.1)
PROT SERPL-MCNC: 7.6 G/DL (ref 6.4–8.2)
PROTHROMBIN TIME: 10.3 SEC (ref 9–11.1)
RBC # BLD AUTO: 3.7 M/UL (ref 4.1–5.7)
RBC MORPH BLD: ABNORMAL
RBC MORPH BLD: ABNORMAL
SODIUM SERPL-SCNC: 141 MMOL/L (ref 136–145)
WBC # BLD AUTO: 10.5 K/UL (ref 4.1–11.1)

## 2024-03-22 PROCEDURE — 70450 CT HEAD/BRAIN W/O DYE: CPT

## 2024-03-22 PROCEDURE — 36415 COLL VENOUS BLD VENIPUNCTURE: CPT

## 2024-03-22 PROCEDURE — 82077 ASSAY SPEC XCP UR&BREATH IA: CPT

## 2024-03-22 PROCEDURE — 80053 COMPREHEN METABOLIC PANEL: CPT

## 2024-03-22 PROCEDURE — 70486 CT MAXILLOFACIAL W/O DYE: CPT

## 2024-03-22 PROCEDURE — 85025 COMPLETE CBC W/AUTO DIFF WBC: CPT

## 2024-03-22 PROCEDURE — 85610 PROTHROMBIN TIME: CPT

## 2024-03-22 PROCEDURE — 72125 CT NECK SPINE W/O DYE: CPT

## 2024-03-22 PROCEDURE — 99284 EMERGENCY DEPT VISIT MOD MDM: CPT

## 2024-03-22 RX ORDER — CLINDAMYCIN HYDROCHLORIDE 300 MG/1
300 CAPSULE ORAL 3 TIMES DAILY
Qty: 15 CAPSULE | Refills: 0 | Status: SHIPPED | OUTPATIENT
Start: 2024-03-22 | End: 2024-03-27

## 2024-03-22 ASSESSMENT — PAIN SCALES - GENERAL: PAINLEVEL_OUTOF10: 1

## 2024-03-22 ASSESSMENT — PAIN DESCRIPTION - LOCATION: LOCATION: FACE

## 2024-03-22 ASSESSMENT — PAIN DESCRIPTION - DESCRIPTORS: DESCRIPTORS: ACHING

## 2024-03-22 NOTE — ED PROVIDER NOTES
findings:        Interpretation per the Radiologist below, if available at the time of this note:    CT Head W/O Contrast   Final Result   No acute intracranial hemorrhage, mass or infarct.          CT CSpine W/O Contrast   Final Result      1.  No acute osseous abnormality.   2. Multilevel degenerative spondylosis.          CT MAXILLOFACIAL WO CONTRAST   Final Result      1. Acute appearing nasal bone fractures.   2. Paranasal sinus disease.   3. Right periorbital soft tissue swelling.           LABS:  Labs Reviewed   CBC WITH AUTO DIFFERENTIAL - Abnormal; Notable for the following components:       Result Value    RBC 3.70 (*)     .7 (*)     MCH 37.8 (*)     Monocytes Absolute 1.3 (*)     Eosinophils Absolute 0.5 (*)     All other components within normal limits   COMPREHENSIVE METABOLIC PANEL - Abnormal; Notable for the following components:    AST 62 (*)     Albumin/Globulin Ratio 0.9 (*)     All other components within normal limits   PROTIME-INR   ETHANOL       All other labs were within normal range or not returned as of this dictation.    EMERGENCY DEPARTMENT COURSE and DIFFERENTIAL DIAGNOSIS/MDM:   Vitals:    Vitals:    03/22/24 1330 03/22/24 1400   BP: (!) 157/90    Pulse: (!) 108    Resp: 16    Temp: 99.2 °F (37.3 °C)    TempSrc: Oral    SpO2: 95% 93%   Weight: 127.7 kg (281 lb 8.4 oz)            Medical Decision Making  73-year-old male presents emergency department chief complaint of facial pain.  Patient was seen at urgent care and sent here for further evaluation secondary to ecchymosis.  Reviewed prior records and patient has a history of B12 folate deficiency, consider abhinav mitten alcohol use.  Will order lab work and CT scans    Amount and/or Complexity of Data Reviewed  Labs: ordered.  Radiology: ordered.            REASSESSMENT        Noted elevated MCV and MCH which can be consistent with alcohol abuse.  Patient does state that he drinks daily but denies a problem.  Did discuss with him

## 2024-03-22 NOTE — ED TRIAGE NOTES
Pt arrives with fall last night after a tripping on his deck. Was seen at [facility] and sent to rule out brain bleed. + bruising and swelling to R eye and L eye.

## 2024-05-20 ENCOUNTER — TELEPHONE (OUTPATIENT)
Age: 74
End: 2024-05-20

## 2024-05-21 NOTE — TELEPHONE ENCOUNTER
05/21/24 11:34 AM Return call placed to patient. Verified patient ID x 2. Advised of message per Melissa ELLIS. Patient voiced understanding but declines referral to [facility] location. He prefers to see provider outside of [facility] due to past experiences with billing issues. Apologized for patient's experiences and encouraged him to follow up with PCP. Patient voiced understanding and expressed appreciation of call.

## 2024-07-01 DIAGNOSIS — D72.821 MONOCYTOSIS: ICD-10-CM

## 2024-07-01 DIAGNOSIS — D75.89 MACROCYTOSIS WITHOUT ANEMIA: ICD-10-CM

## 2024-08-02 ENCOUNTER — OFFICE VISIT (OUTPATIENT)
Age: 74
End: 2024-08-02
Payer: MEDICARE

## 2024-08-02 VITALS
WEIGHT: 270.2 LBS | BODY MASS INDEX: 40.02 KG/M2 | SYSTOLIC BLOOD PRESSURE: 116 MMHG | OXYGEN SATURATION: 96 % | HEART RATE: 98 BPM | DIASTOLIC BLOOD PRESSURE: 66 MMHG | HEIGHT: 69 IN | TEMPERATURE: 98.4 F | RESPIRATION RATE: 16 BRPM

## 2024-08-02 DIAGNOSIS — K92.1 MELENA: ICD-10-CM

## 2024-08-02 DIAGNOSIS — R10.12 LEFT UPPER QUADRANT ABDOMINAL PAIN: Primary | ICD-10-CM

## 2024-08-02 DIAGNOSIS — R63.0 LACK OF APPETITE: ICD-10-CM

## 2024-08-02 DIAGNOSIS — I15.9 SECONDARY HYPERTENSION: ICD-10-CM

## 2024-08-02 DIAGNOSIS — R10.12 LEFT UPPER QUADRANT ABDOMINAL PAIN: ICD-10-CM

## 2024-08-02 PROBLEM — I25.119 CORONARY ARTERY DISEASE INVOLVING NATIVE CORONARY ARTERY OF NATIVE HEART WITH ANGINA PECTORIS (HCC): Status: RESOLVED | Noted: 2022-09-25 | Resolved: 2024-08-02

## 2024-08-02 LAB
ALBUMIN SERPL-MCNC: 3.4 G/DL (ref 3.5–5)
ALBUMIN/GLOB SERPL: 1.1 (ref 1.1–2.2)
ALP SERPL-CCNC: 71 U/L (ref 45–117)
ALT SERPL-CCNC: 44 U/L (ref 12–78)
ANION GAP SERPL CALC-SCNC: 7 MMOL/L (ref 5–15)
AST SERPL-CCNC: 34 U/L (ref 15–37)
BILIRUB SERPL-MCNC: 0.7 MG/DL (ref 0.2–1)
BUN SERPL-MCNC: 41 MG/DL (ref 6–20)
BUN/CREAT SERPL: 42 (ref 12–20)
CALCIUM SERPL-MCNC: 9.4 MG/DL (ref 8.5–10.1)
CHLORIDE SERPL-SCNC: 104 MMOL/L (ref 97–108)
CO2 SERPL-SCNC: 29 MMOL/L (ref 21–32)
CREAT SERPL-MCNC: 0.98 MG/DL (ref 0.7–1.3)
ERYTHROCYTE [DISTWIDTH] IN BLOOD BY AUTOMATED COUNT: 12.1 % (ref 11.5–14.5)
GLOBULIN SER CALC-MCNC: 3 G/DL (ref 2–4)
GLUCOSE SERPL-MCNC: 146 MG/DL (ref 65–100)
HCT VFR BLD AUTO: 29.2 % (ref 36.6–50.3)
HGB BLD-MCNC: 9.8 G/DL (ref 12.1–17)
LIPASE SERPL-CCNC: 24 U/L (ref 13–75)
MCH RBC QN AUTO: 37.7 PG (ref 26–34)
MCHC RBC AUTO-ENTMCNC: 33.6 G/DL (ref 30–36.5)
MCV RBC AUTO: 112.3 FL (ref 80–99)
NRBC # BLD: 0 K/UL (ref 0–0.01)
NRBC BLD-RTO: 0 PER 100 WBC
PLATELET # BLD AUTO: 188 K/UL (ref 150–400)
PMV BLD AUTO: 11.7 FL (ref 8.9–12.9)
POTASSIUM SERPL-SCNC: 4.3 MMOL/L (ref 3.5–5.1)
PROT SERPL-MCNC: 6.4 G/DL (ref 6.4–8.2)
RBC # BLD AUTO: 2.6 M/UL (ref 4.1–5.7)
SODIUM SERPL-SCNC: 140 MMOL/L (ref 136–145)
WBC # BLD AUTO: 8.9 K/UL (ref 4.1–11.1)

## 2024-08-02 PROCEDURE — G8427 DOCREV CUR MEDS BY ELIG CLIN: HCPCS | Performed by: NURSE PRACTITIONER

## 2024-08-02 PROCEDURE — G8417 CALC BMI ABV UP PARAM F/U: HCPCS | Performed by: NURSE PRACTITIONER

## 2024-08-02 PROCEDURE — 1036F TOBACCO NON-USER: CPT | Performed by: NURSE PRACTITIONER

## 2024-08-02 PROCEDURE — 99214 OFFICE O/P EST MOD 30 MIN: CPT | Performed by: NURSE PRACTITIONER

## 2024-08-02 PROCEDURE — 3017F COLORECTAL CA SCREEN DOC REV: CPT | Performed by: NURSE PRACTITIONER

## 2024-08-02 PROCEDURE — 1123F ACP DISCUSS/DSCN MKR DOCD: CPT | Performed by: NURSE PRACTITIONER

## 2024-08-02 RX ORDER — DICLOFENAC SODIUM 75 MG/1
75 TABLET, DELAYED RELEASE ORAL 2 TIMES DAILY
COMMUNITY
Start: 2024-07-26 | End: 2024-08-02 | Stop reason: SINTOL

## 2024-08-02 RX ORDER — OMEPRAZOLE 40 MG/1
40 CAPSULE, DELAYED RELEASE ORAL
Qty: 90 CAPSULE | Refills: 0 | Status: SHIPPED | OUTPATIENT
Start: 2024-08-02

## 2024-08-02 SDOH — ECONOMIC STABILITY: FOOD INSECURITY: WITHIN THE PAST 12 MONTHS, THE FOOD YOU BOUGHT JUST DIDN'T LAST AND YOU DIDN'T HAVE MONEY TO GET MORE.: NEVER TRUE

## 2024-08-02 SDOH — ECONOMIC STABILITY: HOUSING INSECURITY
IN THE LAST 12 MONTHS, WAS THERE A TIME WHEN YOU DID NOT HAVE A STEADY PLACE TO SLEEP OR SLEPT IN A SHELTER (INCLUDING NOW)?: NO

## 2024-08-02 SDOH — ECONOMIC STABILITY: FOOD INSECURITY: WITHIN THE PAST 12 MONTHS, YOU WORRIED THAT YOUR FOOD WOULD RUN OUT BEFORE YOU GOT MONEY TO BUY MORE.: NEVER TRUE

## 2024-08-02 SDOH — ECONOMIC STABILITY: TRANSPORTATION INSECURITY
IN THE PAST 12 MONTHS, HAS LACK OF TRANSPORTATION KEPT YOU FROM MEETINGS, WORK, OR FROM GETTING THINGS NEEDED FOR DAILY LIVING?: NO

## 2024-08-02 SDOH — ECONOMIC STABILITY: INCOME INSECURITY: HOW HARD IS IT FOR YOU TO PAY FOR THE VERY BASICS LIKE FOOD, HOUSING, MEDICAL CARE, AND HEATING?: NOT HARD AT ALL

## 2024-08-02 ASSESSMENT — PATIENT HEALTH QUESTIONNAIRE - PHQ9
2. FEELING DOWN, DEPRESSED OR HOPELESS: NOT AT ALL
SUM OF ALL RESPONSES TO PHQ9 QUESTIONS 1 & 2: 0
SUM OF ALL RESPONSES TO PHQ QUESTIONS 1-9: 0
1. LITTLE INTEREST OR PLEASURE IN DOING THINGS: NOT AT ALL
SUM OF ALL RESPONSES TO PHQ QUESTIONS 1-9: 0

## 2024-08-02 ASSESSMENT — ENCOUNTER SYMPTOMS
CONSTIPATION: 0
COUGH: 0
RECTAL PAIN: 0
SHORTNESS OF BREATH: 0
SINUS PAIN: 0
BLOOD IN STOOL: 1
EYE PAIN: 0
NAUSEA: 0
CHEST TIGHTNESS: 0
EYES NEGATIVE: 1
EYE REDNESS: 0
DIARRHEA: 1
BACK PAIN: 0
RHINORRHEA: 0
RESPIRATORY NEGATIVE: 1
SINUS PRESSURE: 0
VOMITING: 0
ABDOMINAL PAIN: 1

## 2024-08-02 NOTE — PROGRESS NOTES
Assessment and Plan     1. Left upper quadrant abdominal pain: Positive guaiac testing in office, pt brought stool sample which allow for testing. He declined rectal testing. Stool sample showed obvious melena. Recommended to stop Diclofenac immediately, will start PPI today. Pt agreed with this plan.  Pt declined to stop Aspirin. CBC ordered. Stressed need to see a GI specialist, pt declined referral as he will call us with name of GI. Offered helping fin/ding GI doctor but pt declined. After CBC resulted with obvious dropped on hgb, hctt  pt was called on 08/03 around 2pm and informed of findings. Pt has stopped Diclofenac and agrees to stop taking Aspirin. Will recheck CBC on Tuesday. Stressed again need to see GI, continues to decline referral. Will call again Monday for his preference of GI specialist. Has appointment for CT scan on 08/08. SXS to seek urgent care discussed. Pt verbalized understanding.  Dr. Villavicencio, PCP,  was informed of physical finding and results, agrees with plan.    -     CT ABDOMEN PELVIS W WO CONTRAST Additional Contrast? None; Future  -     Lipase; Future  -     Comprehensive Metabolic Panel; Future  2. Melena  -     CBC; Future  -     CT ABDOMEN PELVIS W WO CONTRAST Additional Contrast? None; Future  -     Comprehensive Metabolic Panel; Future  3. Lack of appetite  -     CT ABDOMEN PELVIS W WO CONTRAST Additional Contrast? None; Future  4. Secondary hypertension: Stable.      BP Readings from Last 3 Encounters:   08/02/24 116/66   03/22/24 (!) 162/84   12/12/23 (!) 157/83   Recent Relevant Labs:    Recent Labs     08/02/24  1004   WBC 8.9   HGB 9.8*   HCT 29.2*   .3*        Lab Results   Component Value Date     08/02/2024    K 4.3 08/02/2024     08/02/2024    CO2 29 08/02/2024    BUN 41 (H) 08/02/2024    CREATININE 0.98 08/02/2024    GLUCOSE 146 (H) 08/02/2024    CALCIUM 9.4 08/02/2024    BILITOT 0.7 08/02/2024    ALKPHOS 71 08/02/2024    AST 34 08/02/2024

## 2024-08-04 DIAGNOSIS — D64.9 ANEMIA, UNSPECIFIED TYPE: Primary | ICD-10-CM

## 2024-08-05 ENCOUNTER — CLINICAL DOCUMENTATION (OUTPATIENT)
Age: 74
End: 2024-08-05

## 2024-08-05 ENCOUNTER — TELEPHONE (OUTPATIENT)
Age: 74
End: 2024-08-05

## 2024-08-05 DIAGNOSIS — K92.1 MELENA: Primary | ICD-10-CM

## 2024-08-05 DIAGNOSIS — D64.9 ANEMIA, UNSPECIFIED TYPE: ICD-10-CM

## 2024-08-05 NOTE — TELEPHONE ENCOUNTER
I spoke with  at [facility], I advised pt needs an appt for evaluation of GI bleed and decreasing hgb. There has been a cancellation, pt Appt schedule with Dr. Catia Pierce at [facility] for 8/14/24 at 930am, pt needs to arrive at 915am.     I spoke with pt, to advise of the above message, pt verbalized understanding and will go to that appt. Pt states he will go to [facility] for repeat lab work tomorrow.

## 2024-08-06 DIAGNOSIS — D64.9 ANEMIA, UNSPECIFIED TYPE: ICD-10-CM

## 2024-08-06 LAB
ERYTHROCYTE [DISTWIDTH] IN BLOOD BY AUTOMATED COUNT: 12.9 % (ref 11.5–14.5)
HCT VFR BLD AUTO: 25.4 % (ref 36.6–50.3)
HGB BLD-MCNC: 8.6 G/DL (ref 12.1–17)
MCH RBC QN AUTO: 38.4 PG (ref 26–34)
MCHC RBC AUTO-ENTMCNC: 33.9 G/DL (ref 30–36.5)
MCV RBC AUTO: 113.4 FL (ref 80–99)
NRBC # BLD: 0.06 K/UL (ref 0–0.01)
NRBC BLD-RTO: 0.8 PER 100 WBC
PLATELET # BLD AUTO: 216 K/UL (ref 150–400)
PMV BLD AUTO: 11.7 FL (ref 8.9–12.9)
RBC # BLD AUTO: 2.24 M/UL (ref 4.1–5.7)
WBC # BLD AUTO: 7.1 K/UL (ref 4.1–11.1)

## 2024-08-07 DIAGNOSIS — D64.9 ANEMIA, UNSPECIFIED TYPE: Primary | ICD-10-CM

## 2024-08-07 RX ORDER — FERROUS SULFATE 325(65) MG
325 TABLET ORAL
Qty: 30 TABLET | Refills: 0 | Status: SHIPPED | OUTPATIENT
Start: 2024-08-07 | End: 2024-09-06

## 2024-08-07 NOTE — PROGRESS NOTES
Spoke with Mr. Armas to discuss CBC results. Recommended to continue to stay off Aspirin and Diclofenac. Melena has resolved, L sided abdominal pain is improving. Recommended to start iron supplements, pt agreed. Iron rich foods recommended. SXS to seek urgent care discussed.  Pt verbalized understanding.

## 2024-08-08 ENCOUNTER — HOSPITAL ENCOUNTER (OUTPATIENT)
Facility: HOSPITAL | Age: 74
Discharge: HOME | End: 2024-08-08
Payer: MEDICARE

## 2024-08-08 DIAGNOSIS — R63.0 LACK OF APPETITE: ICD-10-CM

## 2024-08-08 DIAGNOSIS — K92.1 MELENA: ICD-10-CM

## 2024-08-08 DIAGNOSIS — R10.12 LEFT UPPER QUADRANT ABDOMINAL PAIN: ICD-10-CM

## 2024-08-08 PROCEDURE — 74177 CT ABD & PELVIS W/CONTRAST: CPT

## 2024-08-08 PROCEDURE — 6360000004 HC RX CONTRAST MEDICATION: Performed by: NURSE PRACTITIONER

## 2024-08-08 RX ADMIN — IOPAMIDOL 100 ML: 755 INJECTION, SOLUTION INTRAVENOUS at 08:25

## 2024-08-10 ENCOUNTER — HOSPITAL ENCOUNTER (EMERGENCY)
Facility: HOSPITAL | Age: 74
Discharge: HOME | End: 2024-08-10
Attending: EMERGENCY MEDICINE
Payer: MEDICARE

## 2024-08-10 ENCOUNTER — APPOINTMENT (OUTPATIENT)
Facility: HOSPITAL | Age: 74
End: 2024-08-10
Payer: MEDICARE

## 2024-08-10 VITALS
BODY MASS INDEX: 41.21 KG/M2 | HEIGHT: 69 IN | TEMPERATURE: 98.5 F | RESPIRATION RATE: 18 BRPM | DIASTOLIC BLOOD PRESSURE: 84 MMHG | OXYGEN SATURATION: 92 % | WEIGHT: 278.22 LBS | HEART RATE: 79 BPM | SYSTOLIC BLOOD PRESSURE: 179 MMHG

## 2024-08-10 DIAGNOSIS — R10.814 LEFT LOWER QUADRANT ABDOMINAL TENDERNESS WITHOUT REBOUND TENDERNESS: Primary | ICD-10-CM

## 2024-08-10 LAB
ALBUMIN SERPL-MCNC: 3.2 G/DL (ref 3.5–5)
ALBUMIN/GLOB SERPL: 1 (ref 1.1–2.2)
ALP SERPL-CCNC: 80 U/L (ref 45–117)
ALT SERPL-CCNC: 38 U/L (ref 12–78)
ANION GAP SERPL CALC-SCNC: 7 MMOL/L (ref 5–15)
AST SERPL-CCNC: 24 U/L (ref 15–37)
BASOPHILS # BLD: 0.1 K/UL (ref 0–0.1)
BASOPHILS NFR BLD: 1 % (ref 0–1)
BILIRUB SERPL-MCNC: 0.4 MG/DL (ref 0.2–1)
BUN SERPL-MCNC: 9 MG/DL (ref 6–20)
BUN/CREAT SERPL: 10 (ref 12–20)
CALCIUM SERPL-MCNC: 8.3 MG/DL (ref 8.5–10.1)
CHLORIDE SERPL-SCNC: 104 MMOL/L (ref 97–108)
CO2 SERPL-SCNC: 30 MMOL/L (ref 21–32)
CREAT SERPL-MCNC: 0.9 MG/DL (ref 0.7–1.3)
DIFFERENTIAL METHOD BLD: ABNORMAL
EOSINOPHIL # BLD: 0.4 K/UL (ref 0–0.4)
EOSINOPHIL NFR BLD: 6 % (ref 0–7)
ERYTHROCYTE [DISTWIDTH] IN BLOOD BY AUTOMATED COUNT: 13.6 % (ref 11.5–14.5)
GLOBULIN SER CALC-MCNC: 3.3 G/DL (ref 2–4)
GLUCOSE SERPL-MCNC: 150 MG/DL (ref 65–100)
HCT VFR BLD AUTO: 27.5 % (ref 36.6–50.3)
HGB BLD-MCNC: 9.2 G/DL (ref 12.1–17)
IMM GRANULOCYTES # BLD AUTO: 0.1 K/UL (ref 0–0.04)
IMM GRANULOCYTES NFR BLD AUTO: 1 % (ref 0–0.5)
LIPASE SERPL-CCNC: 28 U/L (ref 13–75)
LYMPHOCYTES # BLD: 1.2 K/UL (ref 0.8–3.5)
LYMPHOCYTES NFR BLD: 19 % (ref 12–49)
MAGNESIUM SERPL-MCNC: 1.8 MG/DL (ref 1.6–2.4)
MCH RBC QN AUTO: 37.4 PG (ref 26–34)
MCHC RBC AUTO-ENTMCNC: 33.5 G/DL (ref 30–36.5)
MCV RBC AUTO: 111.8 FL (ref 80–99)
MONOCYTES # BLD: 0.6 K/UL (ref 0–1)
MONOCYTES NFR BLD: 10 % (ref 5–13)
NEUTS SEG # BLD: 3.7 K/UL (ref 1.8–8)
NEUTS SEG NFR BLD: 63 % (ref 32–75)
NRBC # BLD: 0 K/UL (ref 0–0.01)
NRBC BLD-RTO: 0 PER 100 WBC
PLATELET # BLD AUTO: 218 K/UL (ref 150–400)
PMV BLD AUTO: 11.2 FL (ref 8.9–12.9)
POTASSIUM SERPL-SCNC: 3.7 MMOL/L (ref 3.5–5.1)
PROT SERPL-MCNC: 6.5 G/DL (ref 6.4–8.2)
RBC # BLD AUTO: 2.46 M/UL (ref 4.1–5.7)
RBC MORPH BLD: ABNORMAL
SODIUM SERPL-SCNC: 141 MMOL/L (ref 136–145)
WBC # BLD AUTO: 6.1 K/UL (ref 4.1–11.1)

## 2024-08-10 PROCEDURE — 83735 ASSAY OF MAGNESIUM: CPT

## 2024-08-10 PROCEDURE — 85025 COMPLETE CBC W/AUTO DIFF WBC: CPT

## 2024-08-10 PROCEDURE — 36415 COLL VENOUS BLD VENIPUNCTURE: CPT

## 2024-08-10 PROCEDURE — 99285 EMERGENCY DEPT VISIT HI MDM: CPT

## 2024-08-10 PROCEDURE — 96374 THER/PROPH/DIAG INJ IV PUSH: CPT

## 2024-08-10 PROCEDURE — 74177 CT ABD & PELVIS W/CONTRAST: CPT

## 2024-08-10 PROCEDURE — 80053 COMPREHEN METABOLIC PANEL: CPT

## 2024-08-10 PROCEDURE — 6360000004 HC RX CONTRAST MEDICATION: Performed by: EMERGENCY MEDICINE

## 2024-08-10 PROCEDURE — 6360000002 HC RX W HCPCS: Performed by: EMERGENCY MEDICINE

## 2024-08-10 PROCEDURE — 83690 ASSAY OF LIPASE: CPT

## 2024-08-10 PROCEDURE — 2580000003 HC RX 258: Performed by: EMERGENCY MEDICINE

## 2024-08-10 RX ORDER — MORPHINE SULFATE 4 MG/ML
4 INJECTION, SOLUTION INTRAMUSCULAR; INTRAVENOUS
Status: COMPLETED | OUTPATIENT
Start: 2024-08-10 | End: 2024-08-10

## 2024-08-10 RX ORDER — SODIUM CHLORIDE 9 MG/ML
INJECTION, SOLUTION INTRAVENOUS ONCE
Status: COMPLETED | OUTPATIENT
Start: 2024-08-10 | End: 2024-08-10

## 2024-08-10 RX ADMIN — MORPHINE SULFATE 4 MG: 4 INJECTION INTRAVENOUS at 07:04

## 2024-08-10 RX ADMIN — SODIUM CHLORIDE: 9 INJECTION, SOLUTION INTRAVENOUS at 08:23

## 2024-08-10 RX ADMIN — IOPAMIDOL 100 ML: 755 INJECTION, SOLUTION INTRAVENOUS at 07:24

## 2024-08-10 ASSESSMENT — PAIN SCALES - GENERAL: PAINLEVEL_OUTOF10: 1

## 2024-08-10 ASSESSMENT — PAIN - FUNCTIONAL ASSESSMENT: PAIN_FUNCTIONAL_ASSESSMENT: 0-10

## 2024-08-10 NOTE — ED TRIAGE NOTES
Pt complaining of left lower abdominal pain.  Pain x3 days.  Denies n/v/d or urinary symptoms.  Seen at pcp and given diclofenac which caused a gi bleed.  Stopped taking diclofenac and bleeding stopped.

## 2024-08-10 NOTE — ED PROVIDER NOTES
[facility]  EMERGENCY DEPARTMENT ENCOUNTER      Pt Name: Carlos Armas  MRN: [id number]  Birthdate 1950  Date of evaluation: 8/10/2024  Provider: Kinsey Finn MD    CHIEF COMPLAINT       Chief Complaint   Patient presents with    Abdominal Pain         HISTORY OF PRESENT ILLNESS   (Location/Symptom, Timing/Onset, Context/Setting, Quality, Duration, Modifying Factors, Severity)  Note limiting factors.   Patient is a 73-year-old male with history of BPH, coronary disease with 3 stents, hypertension who presents with left sided abdominal pain and rectal bleeding.  Patient reports that earlier this month he noticed black tarry stools.  He saw his PCP who told him to stop taking his diclofenac and ordered outpatient labs and a CT scan.  Patient reports that the bleeding is no longer present after he stop diclofenac, but now complains of left lower quadrant abdominal pain that persists.  Patient says he feels fatigued, denies chest pain, lightheadedness.  Patient is not on any blood thinners, denies prior history of GI bleed.        Nursing Notes were reviewed.    REVIEW OF SYSTEMS    Not Required     Review of Systems    PAST MEDICAL HISTORY     Past Medical History:   Diagnosis Date    BPH (benign prostatic hyperplasia) 11/8/2022 5/11/21 dr surendra jaime did a robotic lap  Simple prostatectomy benign tissue but had issues with retention    CAD (coronary artery disease)     3 stents    Elevated PSA 11/16/2020    Dr godfrey biopsy neg    FH: colon cancer 11/16/2020    Hypertension     Other ill-defined conditions(799.89)     bph    Other ill-defined conditions(799.89)     hypercholesterolemia    Other ill-defined conditions(799.89)     accidental finger amputation       SURGICAL HISTORY       Past Surgical History:   Procedure Laterality Date    JOINT REPLACEMENT  right shoulder    ORTHOPEDIC SURGERY      l sholder surg    [place] UNLISTED PROCEDURE CARDIAC SURGERY      3 stents    PROSTATE SURGERY

## 2024-08-10 NOTE — ED NOTES
Pt signed out to me at 7am pending CT abd  73 y.o. male here today with LLQ and hx of rectal bleeding that has resolved.   Labs ok/baseline. Plan in sign out is for CT abd and if neg ok for dc.    CT abd negative for acute process.  Labs reassuring.  On my exam in no acute distress, no reproducible tenderness. ?msk, worst with lying flat so could be psoas spasm. Ok for dc home with continued outpatient f/u.    DO Brandon Rogers Courtland E, DO  08/10/24 0827

## 2024-08-10 NOTE — DISCHARGE INSTRUCTIONS
Your CT shows:    No acute findings or findings to correlate with left lower quadrant  abdominal pain. Incidentals as above including coronary artery disease, hepatic  steatosis, and sigmoid diverticulosis.

## 2024-08-10 NOTE — ED NOTES
Bedside and Verbal shift change report given to Skylar RUTLEDGE RN (oncoming nurse) by DANIEL Gibbs (offgoing nurse). Report included the following information Nurse Handoff Report, ED Encounter Summary, ED SBAR, Adult Overview, MAR, Recent Results, and Med Rec Status.

## 2024-08-19 ENCOUNTER — ANESTHESIA EVENT (OUTPATIENT)
Facility: HOSPITAL | Age: 74
End: 2024-08-19
Payer: MEDICARE

## 2024-08-19 ENCOUNTER — HOSPITAL ENCOUNTER (OUTPATIENT)
Facility: HOSPITAL | Age: 74
Setting detail: OUTPATIENT SURGERY
Discharge: HOME | End: 2024-08-19
Attending: INTERNAL MEDICINE | Admitting: INTERNAL MEDICINE
Payer: MEDICARE

## 2024-08-19 ENCOUNTER — ANESTHESIA (OUTPATIENT)
Facility: HOSPITAL | Age: 74
End: 2024-08-19
Payer: MEDICARE

## 2024-08-19 VITALS
HEART RATE: 96 BPM | DIASTOLIC BLOOD PRESSURE: 77 MMHG | SYSTOLIC BLOOD PRESSURE: 145 MMHG | RESPIRATION RATE: 23 BRPM | BODY MASS INDEX: 40.29 KG/M2 | WEIGHT: 272 LBS | HEIGHT: 69 IN | OXYGEN SATURATION: 95 % | TEMPERATURE: 97.8 F

## 2024-08-19 PROCEDURE — 2720000010 HC SURG SUPPLY STERILE: Performed by: INTERNAL MEDICINE

## 2024-08-19 PROCEDURE — 3700000000 HC ANESTHESIA ATTENDED CARE: Performed by: INTERNAL MEDICINE

## 2024-08-19 PROCEDURE — 2709999900 HC NON-CHARGEABLE SUPPLY: Performed by: INTERNAL MEDICINE

## 2024-08-19 PROCEDURE — 7100000011 HC PHASE II RECOVERY - ADDTL 15 MIN: Performed by: INTERNAL MEDICINE

## 2024-08-19 PROCEDURE — 6360000002 HC RX W HCPCS: Performed by: NURSE ANESTHETIST, CERTIFIED REGISTERED

## 2024-08-19 PROCEDURE — 3600007512: Performed by: INTERNAL MEDICINE

## 2024-08-19 PROCEDURE — 3600007502: Performed by: INTERNAL MEDICINE

## 2024-08-19 PROCEDURE — 2500000003 HC RX 250 WO HCPCS: Performed by: NURSE ANESTHETIST, CERTIFIED REGISTERED

## 2024-08-19 PROCEDURE — 2580000003 HC RX 258: Performed by: NURSE ANESTHETIST, CERTIFIED REGISTERED

## 2024-08-19 PROCEDURE — 3700000001 HC ADD 15 MINUTES (ANESTHESIA): Performed by: INTERNAL MEDICINE

## 2024-08-19 PROCEDURE — 7100000010 HC PHASE II RECOVERY - FIRST 15 MIN: Performed by: INTERNAL MEDICINE

## 2024-08-19 PROCEDURE — 88305 TISSUE EXAM BY PATHOLOGIST: CPT

## 2024-08-19 RX ORDER — LIDOCAINE HYDROCHLORIDE 20 MG/ML
INJECTION, SOLUTION EPIDURAL; INFILTRATION; INTRACAUDAL; PERINEURAL PRN
Status: DISCONTINUED | OUTPATIENT
Start: 2024-08-19 | End: 2024-08-19 | Stop reason: SDUPTHER

## 2024-08-19 RX ORDER — SODIUM CHLORIDE 0.9 % (FLUSH) 0.9 %
5-40 SYRINGE (ML) INJECTION EVERY 12 HOURS SCHEDULED
Status: DISCONTINUED | OUTPATIENT
Start: 2024-08-19 | End: 2024-08-19 | Stop reason: HOSPADM

## 2024-08-19 RX ORDER — SODIUM CHLORIDE 9 MG/ML
INJECTION, SOLUTION INTRAVENOUS CONTINUOUS PRN
Status: DISCONTINUED | OUTPATIENT
Start: 2024-08-19 | End: 2024-08-19 | Stop reason: SDUPTHER

## 2024-08-19 RX ORDER — SODIUM CHLORIDE 0.9 % (FLUSH) 0.9 %
5-40 SYRINGE (ML) INJECTION PRN
Status: DISCONTINUED | OUTPATIENT
Start: 2024-08-19 | End: 2024-08-19 | Stop reason: HOSPADM

## 2024-08-19 RX ORDER — SODIUM CHLORIDE 9 MG/ML
INJECTION, SOLUTION INTRAVENOUS CONTINUOUS
Status: DISCONTINUED | OUTPATIENT
Start: 2024-08-19 | End: 2024-08-19 | Stop reason: HOSPADM

## 2024-08-19 RX ORDER — SODIUM CHLORIDE 9 MG/ML
25 INJECTION, SOLUTION INTRAVENOUS PRN
Status: DISCONTINUED | OUTPATIENT
Start: 2024-08-19 | End: 2024-08-19 | Stop reason: HOSPADM

## 2024-08-19 RX ADMIN — PROPOFOL 50 MG: 10 INJECTION, EMULSION INTRAVENOUS at 12:21

## 2024-08-19 RX ADMIN — PROPOFOL 50 MG: 10 INJECTION, EMULSION INTRAVENOUS at 12:17

## 2024-08-19 RX ADMIN — PROPOFOL 50 MG: 10 INJECTION, EMULSION INTRAVENOUS at 12:15

## 2024-08-19 RX ADMIN — PROPOFOL 50 MG: 10 INJECTION, EMULSION INTRAVENOUS at 12:27

## 2024-08-19 RX ADMIN — PROPOFOL 50 MG: 10 INJECTION, EMULSION INTRAVENOUS at 12:14

## 2024-08-19 RX ADMIN — PROPOFOL 50 MG: 10 INJECTION, EMULSION INTRAVENOUS at 12:48

## 2024-08-19 RX ADMIN — PROPOFOL 40 MG: 10 INJECTION, EMULSION INTRAVENOUS at 12:37

## 2024-08-19 RX ADMIN — PROPOFOL 60 MG: 10 INJECTION, EMULSION INTRAVENOUS at 12:30

## 2024-08-19 RX ADMIN — PROPOFOL 50 MG: 10 INJECTION, EMULSION INTRAVENOUS at 12:23

## 2024-08-19 RX ADMIN — PROPOFOL 50 MG: 10 INJECTION, EMULSION INTRAVENOUS at 12:33

## 2024-08-19 RX ADMIN — PROPOFOL 80 MG: 10 INJECTION, EMULSION INTRAVENOUS at 12:11

## 2024-08-19 RX ADMIN — PROPOFOL 50 MG: 10 INJECTION, EMULSION INTRAVENOUS at 12:41

## 2024-08-19 RX ADMIN — PROPOFOL 80 MG: 10 INJECTION, EMULSION INTRAVENOUS at 12:12

## 2024-08-19 RX ADMIN — PROPOFOL 50 MG: 10 INJECTION, EMULSION INTRAVENOUS at 12:25

## 2024-08-19 RX ADMIN — PROPOFOL 50 MG: 10 INJECTION, EMULSION INTRAVENOUS at 12:18

## 2024-08-19 RX ADMIN — PROPOFOL 50 MG: 10 INJECTION, EMULSION INTRAVENOUS at 12:43

## 2024-08-19 RX ADMIN — LIDOCAINE HYDROCHLORIDE 50 MG: 20 INJECTION, SOLUTION EPIDURAL; INFILTRATION; INTRACAUDAL; PERINEURAL at 12:11

## 2024-08-19 RX ADMIN — PROPOFOL 100 MG: 10 INJECTION, EMULSION INTRAVENOUS at 12:46

## 2024-08-19 RX ADMIN — PROPOFOL 50 MG: 10 INJECTION, EMULSION INTRAVENOUS at 12:40

## 2024-08-19 RX ADMIN — PROPOFOL 50 MG: 10 INJECTION, EMULSION INTRAVENOUS at 12:35

## 2024-08-19 RX ADMIN — PROPOFOL 40 MG: 10 INJECTION, EMULSION INTRAVENOUS at 12:13

## 2024-08-19 RX ADMIN — SODIUM CHLORIDE: 9 INJECTION, SOLUTION INTRAVENOUS at 12:05

## 2024-08-19 ASSESSMENT — PAIN SCALES - GENERAL
PAINLEVEL_OUTOF10: 0
PAINLEVEL_OUTOF10: 0

## 2024-08-19 NOTE — ANESTHESIA POSTPROCEDURE EVALUATION
Department of Anesthesiology  Postprocedure Note    Patient: Carlos Armas  MRN: [id number]  YOB: 1950  Date of evaluation: 8/19/2024    Procedure Summary       Date: 08/19/24 Room / Location: [facility] ENDO 01 / [facility] ENDOSCOPY    Anesthesia Start: 1205 Anesthesia Stop: 1250    Procedures:       ESOPHAGOGASTRODUODENOSCOPY (Upper GI Region)      COLONOSCOPY DIAGNOSTIC (Lower GI Region) Diagnosis:       Alcohol abuse      Iron deficiency anemia, unspecified iron deficiency anemia type      Melena      (Alcohol abuse [F10.10])      (Iron deficiency anemia, unspecified iron deficiency anemia type [D50.9])      (Melena [K92.1])    Surgeons: Catia Stearns MD Responsible Provider: Henna Harrison DO    Anesthesia Type: MAC ASA Status: 3            Anesthesia Type: MAC    Lucita Phase I: Lucita Score: 10    Lucita Phase II: Lucita Score: 10    Anesthesia Post Evaluation    Patient location during evaluation: PACU  Level of consciousness: awake  Airway patency: patent  Nausea & Vomiting: no nausea  Cardiovascular status: hemodynamically stable  Respiratory status: acceptable  Hydration status: stable  Multimodal analgesia pain management approach  Pain management: adequate    No notable events documented.

## 2024-08-19 NOTE — H&P
[facility]  [address]  [place] [address] [phone]                                History and Physical     NAME: Carlos Armas   :  1950   MRN:  [id number]     HPI:  The patient was seen and examined.    Past Surgical History:   Procedure Laterality Date    COLONOSCOPY      ENDOSCOPY, COLON, DIAGNOSTIC      JOINT REPLACEMENT  right shoulder    ORTHOPEDIC SURGERY      l sholder surg    [place] UNLISTED PROCEDURE CARDIAC SURGERY      3 stents    PROSTATE SURGERY  to reduce size    SHOULDER ARTHROPLASTY Right      Past Medical History:   Diagnosis Date    BPH (benign prostatic hyperplasia) 2022 dr surendra jaime did a robotic lap  Simple prostatectomy benign tissue but had issues with retention    CAD (coronary artery disease)     3 stents    Elevated PSA 2020    Dr godfrey biopsy neg    FH: colon cancer 2020    Hypertension     Other ill-defined conditions(799.89)     bph    Other ill-defined conditions(799.89)     hypercholesterolemia    Other ill-defined conditions(799.89)     accidental finger amputation     Social History     Tobacco Use    Smoking status: Former     Current packs/day: 0.00     Types: Cigarettes     Quit date: 2010     Years since quittin.7    Smokeless tobacco: Never   Vaping Use    Vaping status: Never Used   Substance Use Topics    Alcohol use: Yes     Alcohol/week: 12.0 standard drinks of alcohol     Types: 12 Cans of beer per week     Comment: social    Drug use: No     Allergies   Allergen Reactions    Penicillins Hives     Pt reports it makes him sleepy and have hives     Family History   Problem Relation Age of Onset    Cancer Mother 50             Colon Cancer Mother     Cancer Father              Heart Attack Father     Cancer Sister              Colon Cancer Sister     Early Death Sister      No current facility-administered medications for this encounter.         PHYSICAL

## 2024-08-19 NOTE — ANESTHESIA PRE PROCEDURE
Department of Anesthesiology  Preprocedure Note       Name:  Carlos Armas   Age:  73 y.o.  :  1950                                          MRN:  [id number]         Date:  2024      Surgeon: Surgeon(s):  Catia Stearns MD    Procedure: Procedure(s):  ESOPHAGOGASTRODUODENOSCOPY  COLONOSCOPY DIAGNOSTIC    Medications prior to admission:   Prior to Admission medications    Medication Sig Start Date End Date Taking? Authorizing Provider   ferrous sulfate (IRON 325) 325 (65 Fe) MG tablet Take 1 tablet by mouth daily (with breakfast) 24 Yes Alzamora Mondragron, Sylvia, APRN - CNP   omeprazole (PRILOSEC) 40 MG delayed release capsule Take 1 capsule by mouth every morning (before breakfast) 24  Yes Alzamora Mondragron, Sylvia, APRN - CNP   amLODIPine (NORVASC) 5 MG tablet Take 1 tablet by mouth daily   Yes Automatic Reconciliation, Ar   aspirin 81 MG chewable tablet Take 1 tablet by mouth daily   Yes Automatic Reconciliation, Ar   atorvastatin (LIPITOR) 40 MG tablet Take 0.5 tablets by mouth   Yes Automatic Reconciliation, Ar   furosemide (LASIX) 20 MG tablet Take 1 tablet by mouth daily   Yes Automatic Reconciliation, Ar   metoprolol succinate (TOPROL XL) 50 MG extended release tablet Take 1 tablet by mouth daily   Yes Automatic Reconciliation, Ar       Current medications:    No current facility-administered medications for this encounter.       Allergies:    Allergies   Allergen Reactions    Penicillins Hives     Pt reports it makes him sleepy and have hives       Problem List:    Patient Active Problem List   Diagnosis Code    Elevated PSA R97.20    Unstable angina (HCC) I20.0    Chest pain, rule out acute myocardial infarction R07.9    FH: colon cancer Z80.0    BPH (benign prostatic hyperplasia) N40.0    Tobacco use disorder, moderate, in sustained remission F17.201       Past Medical History:        Diagnosis Date    BPH (benign prostatic hyperplasia) 2022 dr surendra jaime

## 2024-08-19 NOTE — DISCHARGE INSTRUCTIONS
[facility]  [phone]                                  Carlos Armas  [id number]  1950    It was my pleasure seeing you for your procedure.  You will also receive a summary report with the findings from this procedure and any further recommendations.  If you had polyps removed or biopsies taken during your procedure, you will receive a separate letter from me within approximately the next 2 weeks.  If you don't receive this letter or if you have any questions, please call my office [phone].     Please take note of the post procedure instructions listed below.    Dr. Jinny Alvarez      CARE FOLLOWING YOUR PROCEDURE    These instructions give you information on caring for yourself after your procedure. Call your doctor if you have any problems or questions after your procedure.    HOME CARE  Walk if you have belly cramping or gas.  Walking will help get rid of the air and reduce the bloated feeling in your belly (abdomen).  Your IV site (where you received drugs) may be tender to touch.  Place warm towels on the site; keep your arm up on two pillows if you have any swelling or soreness in the area.  You may shower.    ACTIVITY:  Take frequent rest periods and move at a slower pace for the next 24 hours..  You may resume your regular activity tomorrow if you are feeling back to normal.  Do not drive or ride a bicycle for at least 24 hours (because of the medicine (anesthesia) used during the test).  Do not sign any important legal documents or use or operate any machinery for 24 hours  Do not take sleeping medicines/nerve drugs for 24 hours unless the doctor tells you.  You can return to work/school tomorrow unless otherwise instructed.    NUTRITION:  Drink plenty of fluids to keep your pee (urine) clear or pale yellow  Begin with a light meal and progress to your normal diet. Heavy or fried foods are harder to digest and may make you feel sick to your stomach

## 2024-08-19 NOTE — OP NOTE
Colon Findings:   Rectum: small internal hemorrhoids   Sigmoid: mild diverticulosis   Descending Colon:  mild diverticulosis   Transverse Colon:  mild diverticulosis   Ascending Colon:  mild diverticulosis   Cecum: normal  Terminal Ileum: normal    Colonoscopy Interventions:  none           Specimens Removed:    ID Type Source Tests Collected by Time Destination   1 : Duodenum Bx Tissue Duodenum SURGICAL PATHOLOGY Catia Stearns MD 8/19/2024 1216    2 : Gastric Bx Tissue Gastric SURGICAL PATHOLOGY Catia Stearns MD 8/19/2024 1222        Complications: None.     EBL:  none    Impression:    See Postoperative diagnosis above    Recommendations:   - Await pathology. You should receive a letter within 2 weeks.   - Resume normal medications. Continue PPI once daily.   - Avoid all NSAIDs.   - Recommend repeat colonoscopy in 5 years.     Discharge Disposition:  Home in the company of a  when able to ambulate.    Catia Stearns MD  8/19/2024  12:55 PM

## 2024-08-19 NOTE — PROGRESS NOTES
Initial RN admission and assessment performed and documented in Endoscopy navigator.     Patient evaluated by anesthesia in pre-procedure holding.     All procedural vital signs, airway assessment, and level of consciousness information monitored and recorded by anesthesia staff on the anesthesia record.     Report received from CRNA post procedure.  Patient transported to recovery area by RN.    Endoscopy post procedure time out was performed and specimens were verified by physician.    Endoscope was pre-cleaned at bedside immediately following procedure by Ryland RODRIGUEZ

## 2024-08-29 RX ORDER — FERROUS SULFATE 325(65) MG
1 TABLET ORAL
Qty: 90 TABLET | Refills: 1 | OUTPATIENT
Start: 2024-08-29

## (undated) DEVICE — RADIFOCUS OPTITORQUE ANGIOGRAPHIC CATHETER: Brand: OPTITORQUE

## (undated) DEVICE — HI-TORQUE WHISPER MS GUIDE WIRE .014 STRAIGHT TIP 3.0 CM X 300 CM: Brand: HI-TORQUE WHISPER

## (undated) DEVICE — KIT MFLD ISOLATN NACL CNTRST PRT TBNG SPIK W/ PRSS TRNSDUC

## (undated) DEVICE — PACK PROCEDURE SURG HRT CATH

## (undated) DEVICE — TR BAND RADIAL ARTERY COMPRESSION DEVICE: Brand: TR BAND

## (undated) DEVICE — CATHETER PTCA L138CM BLLN L15MM DIA2.5MM CROSSING PROF

## (undated) DEVICE — GUIDEWIRE VASC L260CM DIA0.035IN TIP L3MM STD EXCHG PTFE J

## (undated) DEVICE — KIT MED IMAG CNTRST AGNT W/ IOPAMIDOL REUSE

## (undated) DEVICE — CUSTOM KT PTCA INFL DEV K05 00052M

## (undated) DEVICE — GLIDESHEATH SLENDER ACCESS KIT: Brand: GLIDESHEATH SLENDER

## (undated) DEVICE — KIT HND CTRL 3 W STPCOCK ROT END 54IN PREM HI PRSS TBNG AT

## (undated) DEVICE — ANGIOGRAPHIC CATHETER: Brand: IMPULSE™

## (undated) DEVICE — SPLINT WR POS F/ARTERIAL ACC -- BX/10

## (undated) DEVICE — 3M™ TEGADERM™ TRANSPARENT FILM DRESSING FRAME STYLE, 1626W, 4 IN X 4-3/4 IN (10 CM X 12 CM), 50/CT 4CT/CASE: Brand: 3M™ TEGADERM™

## (undated) DEVICE — ORISE PROKNIFE 1.5 MM ELECTRODE: Brand: ORISE™ PROKNIFE

## (undated) DEVICE — Device

## (undated) DEVICE — CATH GUID COR EB35 6FR 100CM -- LAUNCHER

## (undated) DEVICE — SUPPLEMENT DIGESTIVE H2O SOL GI-EASE

## (undated) DEVICE — ANGIOGRAPHY KIT

## (undated) DEVICE — PROBE ARC STRAIGHT FIRE 23 MM OD X 220 CM 10/BX

## (undated) DEVICE — RUNTHROUGH NS EXTRA FLOPPY PTCA GUIDEWIRE: Brand: RUNTHROUGH

## (undated) DEVICE — COPILOT BLEEDBACK CONTROL VALVE: Brand: COPILOT

## (undated) DEVICE — DRAPE, RADIAL, STERILE: Brand: MEDLINE

## (undated) DEVICE — CONNECTOR ELECSURG ARCONNECT AR PRB SGL USE DISP

## (undated) DEVICE — ORISE PROKNIFE 3.0 MM ELECTRODE: Brand: ORISE™ PROKNIFE

## (undated) DEVICE — ELECTRODE PT RET AD L9FT HI MOIST COND ADH HYDRGEL CORDED

## (undated) DEVICE — TORQUE DEVICE: Brand: TORQUE DEVICE

## (undated) DEVICE — FORCEPS BX L240CM JAW DIA2.4MM ORNG L CAP W/ NDL DISP RAD

## (undated) DEVICE — SPECIAL PROCEDURE DRAPE 32" X 34": Brand: SPECIAL PROCEDURE DRAPE